# Patient Record
Sex: FEMALE | Race: WHITE | NOT HISPANIC OR LATINO | URBAN - METROPOLITAN AREA
[De-identification: names, ages, dates, MRNs, and addresses within clinical notes are randomized per-mention and may not be internally consistent; named-entity substitution may affect disease eponyms.]

---

## 2023-05-04 ENCOUNTER — OFFICE VISIT (OUTPATIENT)
Dept: PRIMARY CARE | Facility: CLINIC | Age: 58
End: 2023-05-04
Payer: MEDICARE

## 2023-05-04 VITALS
SYSTOLIC BLOOD PRESSURE: 120 MMHG | HEIGHT: 66 IN | DIASTOLIC BLOOD PRESSURE: 74 MMHG | BODY MASS INDEX: 42.75 KG/M2 | WEIGHT: 266 LBS | HEART RATE: 79 BPM

## 2023-05-04 DIAGNOSIS — R05.3 CHRONIC COUGH: ICD-10-CM

## 2023-05-04 DIAGNOSIS — Z12.31 VISIT FOR SCREENING MAMMOGRAM: ICD-10-CM

## 2023-05-04 DIAGNOSIS — Z00.00 ENCOUNTER FOR PREVENTIVE HEALTH EXAMINATION: ICD-10-CM

## 2023-05-04 DIAGNOSIS — R73.03 PREDIABETES: ICD-10-CM

## 2023-05-04 DIAGNOSIS — F33.0 MILD EPISODE OF RECURRENT MAJOR DEPRESSIVE DISORDER (CMS-HCC): Primary | ICD-10-CM

## 2023-05-04 DIAGNOSIS — Z11.59 ENCOUNTER FOR HEPATITIS C SCREENING TEST FOR LOW RISK PATIENT: ICD-10-CM

## 2023-05-04 PROBLEM — E66.813 OBESITY, CLASS III, BMI 40-49.9 (MORBID OBESITY): Status: ACTIVE | Noted: 2019-03-04

## 2023-05-04 PROBLEM — N39.41 URGE INCONTINENCE: Status: ACTIVE | Noted: 2021-11-22

## 2023-05-04 PROBLEM — N39.3 SUI (STRESS URINARY INCONTINENCE, FEMALE): Status: ACTIVE | Noted: 2021-11-22

## 2023-05-04 PROBLEM — E66.01 OBESITY, CLASS III, BMI 40-49.9 (MORBID OBESITY) (MULTI): Status: ACTIVE | Noted: 2019-03-04

## 2023-05-04 PROCEDURE — 4004F PT TOBACCO SCREEN RCVD TLK: CPT | Performed by: STUDENT IN AN ORGANIZED HEALTH CARE EDUCATION/TRAINING PROGRAM

## 2023-05-04 PROCEDURE — 99204 OFFICE O/P NEW MOD 45 MIN: CPT | Performed by: STUDENT IN AN ORGANIZED HEALTH CARE EDUCATION/TRAINING PROGRAM

## 2023-05-04 PROCEDURE — 3008F BODY MASS INDEX DOCD: CPT | Performed by: STUDENT IN AN ORGANIZED HEALTH CARE EDUCATION/TRAINING PROGRAM

## 2023-05-04 RX ORDER — LORAZEPAM 0.5 MG/1
0.5 TABLET ORAL EVERY 6 HOURS PRN
COMMUNITY
Start: 2022-08-31 | End: 2023-07-20 | Stop reason: SDUPTHER

## 2023-05-04 RX ORDER — OMEPRAZOLE 20 MG/1
20 CAPSULE, DELAYED RELEASE ORAL
COMMUNITY

## 2023-05-04 RX ORDER — NADOLOL 20 MG/1
20 TABLET ORAL DAILY
COMMUNITY
Start: 2023-02-07 | End: 2023-07-20 | Stop reason: ALTCHOICE

## 2023-05-04 RX ORDER — ESCITALOPRAM OXALATE 20 MG/1
20 TABLET ORAL DAILY
COMMUNITY
End: 2023-07-20 | Stop reason: SDUPTHER

## 2023-05-04 RX ORDER — CLOBETASOL PROPIONATE 0.5 MG/G
0.05 OINTMENT TOPICAL 2 TIMES DAILY
COMMUNITY
Start: 2022-06-03

## 2023-05-04 RX ORDER — ESCITALOPRAM OXALATE 10 MG/1
10 TABLET ORAL DAILY
COMMUNITY
End: 2023-05-04 | Stop reason: ALTCHOICE

## 2023-05-04 RX ORDER — ALBUTEROL SULFATE 90 UG/1
2 AEROSOL, METERED RESPIRATORY (INHALATION)
COMMUNITY
Start: 2023-02-10 | End: 2023-07-20 | Stop reason: SDUPTHER

## 2023-05-04 RX ORDER — ESCITALOPRAM OXALATE 5 MG/1
5 TABLET ORAL
COMMUNITY
Start: 2023-04-03 | End: 2023-05-04

## 2023-05-04 ASSESSMENT — PATIENT HEALTH QUESTIONNAIRE - PHQ9
2. FEELING DOWN, DEPRESSED OR HOPELESS: NOT AT ALL
SUM OF ALL RESPONSES TO PHQ9 QUESTIONS 1 AND 2: 0
1. LITTLE INTEREST OR PLEASURE IN DOING THINGS: NOT AT ALL

## 2023-05-04 NOTE — PROGRESS NOTES
Zenia Landrum is a 57 y.o. year old female presenting for Depression and Shortness of Breath       HPI:  Depression  She is currently taking Lexapro 15 mg daily for depression and anxiety.  Unfortunately, it is doing a better job treating her anxiety than the depression.  Is getting so bad that her family has been urging her to see a doctor again to get better treatment for her depression.  She just feels angry all the time and has no motivation to do anything.  She is willing to do what ever as far as her medications in order to improve her depression.  She denies SI and HI.  Of note, she does take as needed Ativan 0.5 mg for anxiety when she has a panic attack, but she has been out of this medication for a little while now.  She would like to use this going forward if she needs it for panic attacks.    Shortness of breath  She is a 20-pack-year history smoker, currently trying to quit.  She has noticed that for some time now, she does have a chronic cough and she does get short of breath at times with exertion.  Things really got bad when she had pneumonia 3 months ago, and she feels like she is at the point now where she needs to get her lungs tested for any asthma or COPD.  She was using albuterol as needed, but she has not needed it.  She does know that she wheezes at times.  Her lungs currently feel okay today.  ROS:   All pertinent positive symptoms are included in history of present illness.    All other systems have been reviewed and are negative and noncontributory to this patient's current ailments.    Current Outpatient Medications   Medication Sig Dispense Refill    albuterol 90 mcg/actuation inhaler Inhale 2 puffs 3 times a day.      clobetasol (Temovate) 0.05 % ointment Apply 0.05 Applications topically 2 times a day.      LORazepam (Ativan) 0.5 mg tablet Take 1 tablet (0.5 mg) by mouth every 6 hours if needed (POTS).      nadolol (Corgard) 20 mg tablet Take 1 tablet (20 mg) by mouth once daily.       "escitalopram (Lexapro) 20 mg tablet Take 1 tablet (20 mg) by mouth once daily.      omeprazole (PriLOSEC) 20 mg DR capsule Take 1 capsule (20 mg) by mouth once daily.       No current facility-administered medications for this visit.       OBJECTIVE  Visit Vitals  /74   Pulse 79   Ht 1.676 m (5' 6\")   Wt 121 kg (266 lb)   BMI 42.93 kg/m²   Smoking Status Every Day   BSA 2.37 m²        Physical Exam:  GENERAL: Alert, oriented, pleasant, in no acute distress  HEENT: Head normocephalic, atraumatic  CV: Heart with regular rate and rhythm, normal S1/S2, no murmurs; normal peripheral pulses- radial and dorsalis pedis palpable  LUNGS: CTAB without wheezing, rhonchi or rales; good respiratory effort, no increased work of breathing  ABDOMEN: soft, non-tender, non-distended, no masses appreciated; +BS in all four quadrants  EXTREMITIES: no edema, no cyanosis  PSYCH: Appropriate mood and affect  SKIN: No rashes or lesions appreciated    Assessment/Plan   Diagnoses and all orders for this visit:  Mild episode of recurrent major depressive disorder (CMS/HCC)  Try to optimize her current medication by increasing Lexapro to 20 mg daily instead of doing 15 mg daily.  I asked that she gives this 3 to 4 weeks to see if that makes a difference in her depression.  If it does not, plan on adding Wellbutrin to her regimen.  Chronic cough  With her 20-pack-year smoking history, it is certainly a possibility that she has developed COPD or asthma.  Will confirm with pulmonary function testing at this time  -     Pulmonary function test  Visit for screening mammogram  -     BI mammo bilateral screening tomosynthesis; Future  Fasting labs ordered today include:  -     CBC; Future  -     Comprehensive Metabolic Panel; Future  -     Hemoglobin A1C; Future  -     Lipid Panel; Future  -     TSH with reflex to Free T4 if abnormal; Future  -     Vitamin D, Total; Future  -     Hepatitis C Antibody; Future  -     Hemoglobin A1C; " Future    Patient to return in the next 3 to 4 weeks for depression follow-up, her yearly physical exam and we can discuss any lab results at that time as well.

## 2023-05-15 ENCOUNTER — APPOINTMENT (OUTPATIENT)
Dept: PRIMARY CARE | Facility: CLINIC | Age: 58
End: 2023-05-15

## 2023-05-16 ENCOUNTER — LAB (OUTPATIENT)
Dept: LAB | Facility: LAB | Age: 58
End: 2023-05-16
Payer: MEDICARE

## 2023-05-16 DIAGNOSIS — Z00.00 ENCOUNTER FOR PREVENTIVE HEALTH EXAMINATION: ICD-10-CM

## 2023-05-16 DIAGNOSIS — R73.03 PREDIABETES: ICD-10-CM

## 2023-05-16 DIAGNOSIS — Z11.59 ENCOUNTER FOR HEPATITIS C SCREENING TEST FOR LOW RISK PATIENT: ICD-10-CM

## 2023-05-16 LAB
ALANINE AMINOTRANSFERASE (SGPT) (U/L) IN SER/PLAS: 17 U/L (ref 7–45)
ALBUMIN (G/DL) IN SER/PLAS: 4.1 G/DL (ref 3.4–5)
ALKALINE PHOSPHATASE (U/L) IN SER/PLAS: 55 U/L (ref 33–110)
ANION GAP IN SER/PLAS: 11 MMOL/L (ref 10–20)
ASPARTATE AMINOTRANSFERASE (SGOT) (U/L) IN SER/PLAS: 14 U/L (ref 9–39)
BILIRUBIN TOTAL (MG/DL) IN SER/PLAS: 0.5 MG/DL (ref 0–1.2)
CALCIDIOL (25 OH VITAMIN D3) (NG/ML) IN SER/PLAS: 13 NG/ML
CALCIUM (MG/DL) IN SER/PLAS: 9.3 MG/DL (ref 8.6–10.3)
CARBON DIOXIDE, TOTAL (MMOL/L) IN SER/PLAS: 26 MMOL/L (ref 21–32)
CHLORIDE (MMOL/L) IN SER/PLAS: 105 MMOL/L (ref 98–107)
CHOLESTEROL (MG/DL) IN SER/PLAS: 176 MG/DL (ref 0–199)
CHOLESTEROL IN HDL (MG/DL) IN SER/PLAS: 36.2 MG/DL
CHOLESTEROL/HDL RATIO: 4.9
CREATININE (MG/DL) IN SER/PLAS: 0.56 MG/DL (ref 0.5–1.05)
ERYTHROCYTE DISTRIBUTION WIDTH (RATIO) BY AUTOMATED COUNT: 13.6 % (ref 11.5–14.5)
ERYTHROCYTE MEAN CORPUSCULAR HEMOGLOBIN CONCENTRATION (G/DL) BY AUTOMATED: 33.2 G/DL (ref 32–36)
ERYTHROCYTE MEAN CORPUSCULAR VOLUME (FL) BY AUTOMATED COUNT: 90 FL (ref 80–100)
ERYTHROCYTES (10*6/UL) IN BLOOD BY AUTOMATED COUNT: 5.03 X10E12/L (ref 4–5.2)
GFR FEMALE: >90 ML/MIN/1.73M2
GLUCOSE (MG/DL) IN SER/PLAS: 93 MG/DL (ref 74–99)
HEMATOCRIT (%) IN BLOOD BY AUTOMATED COUNT: 45.2 % (ref 36–46)
HEMOGLOBIN (G/DL) IN BLOOD: 15 G/DL (ref 12–16)
HEPATITIS C VIRUS AB PRESENCE IN SERUM: NONREACTIVE
LDL: 98 MG/DL (ref 0–99)
LEUKOCYTES (10*3/UL) IN BLOOD BY AUTOMATED COUNT: 9.9 X10E9/L (ref 4.4–11.3)
NON HDL CHOLESTEROL: 140 MG/DL
PLATELETS (10*3/UL) IN BLOOD AUTOMATED COUNT: 303 X10E9/L (ref 150–450)
POTASSIUM (MMOL/L) IN SER/PLAS: 4.3 MMOL/L (ref 3.5–5.3)
PROTEIN TOTAL: 6.9 G/DL (ref 6.4–8.2)
SODIUM (MMOL/L) IN SER/PLAS: 138 MMOL/L (ref 136–145)
THYROTROPIN (MIU/L) IN SER/PLAS BY DETECTION LIMIT <= 0.05 MIU/L: 1.58 MIU/L (ref 0.44–3.98)
TRIGLYCERIDE (MG/DL) IN SER/PLAS: 207 MG/DL (ref 0–149)
UREA NITROGEN (MG/DL) IN SER/PLAS: 9 MG/DL (ref 6–23)
VLDL: 41 MG/DL (ref 0–40)

## 2023-05-16 PROCEDURE — 85027 COMPLETE CBC AUTOMATED: CPT

## 2023-05-16 PROCEDURE — 86803 HEPATITIS C AB TEST: CPT

## 2023-05-16 PROCEDURE — 82306 VITAMIN D 25 HYDROXY: CPT

## 2023-05-16 PROCEDURE — 83036 HEMOGLOBIN GLYCOSYLATED A1C: CPT

## 2023-05-16 PROCEDURE — 80053 COMPREHEN METABOLIC PANEL: CPT

## 2023-05-16 PROCEDURE — 36415 COLL VENOUS BLD VENIPUNCTURE: CPT

## 2023-05-16 PROCEDURE — 84443 ASSAY THYROID STIM HORMONE: CPT

## 2023-05-16 PROCEDURE — 80061 LIPID PANEL: CPT

## 2023-05-17 LAB
ESTIMATED AVERAGE GLUCOSE FOR HBA1C: 123 MG/DL
HEMOGLOBIN A1C/HEMOGLOBIN TOTAL IN BLOOD: 5.9 %

## 2023-05-22 ENCOUNTER — PATIENT MESSAGE (OUTPATIENT)
Dept: PRIMARY CARE | Facility: CLINIC | Age: 58
End: 2023-05-22

## 2023-05-22 ENCOUNTER — PATIENT OUTREACH (OUTPATIENT)
Dept: PRIMARY CARE | Facility: CLINIC | Age: 58
End: 2023-05-22

## 2023-06-07 ENCOUNTER — PATIENT OUTREACH (OUTPATIENT)
Dept: PRIMARY CARE | Facility: CLINIC | Age: 58
End: 2023-06-07

## 2023-06-07 NOTE — PROGRESS NOTES
No contact on TCM outreach for 2 wks after inhospital discharge. Pt disenrolled from TCM program per TCM policy.

## 2023-07-20 ENCOUNTER — OFFICE VISIT (OUTPATIENT)
Dept: PRIMARY CARE | Facility: CLINIC | Age: 58
End: 2023-07-20
Payer: MEDICARE

## 2023-07-20 VITALS
WEIGHT: 254 LBS | HEIGHT: 66 IN | SYSTOLIC BLOOD PRESSURE: 122 MMHG | DIASTOLIC BLOOD PRESSURE: 86 MMHG | BODY MASS INDEX: 40.82 KG/M2 | HEART RATE: 79 BPM

## 2023-07-20 DIAGNOSIS — R06.02 SHORTNESS OF BREATH: ICD-10-CM

## 2023-07-20 DIAGNOSIS — I40.9: ICD-10-CM

## 2023-07-20 DIAGNOSIS — F41.9 ANXIETY: Primary | ICD-10-CM

## 2023-07-20 DIAGNOSIS — E66.01 OBESITY, CLASS III, BMI 40-49.9 (MORBID OBESITY) (MULTI): ICD-10-CM

## 2023-07-20 PROBLEM — R91.1 LUNG NODULE: Status: ACTIVE | Noted: 2023-05-19

## 2023-07-20 PROBLEM — I25.10 MILD CAD: Status: ACTIVE | Noted: 2023-05-19

## 2023-07-20 PROBLEM — I51.4 MYOCARDITIS (MULTI): Status: ACTIVE | Noted: 2023-07-20

## 2023-07-20 PROBLEM — I10 PRIMARY HYPERTENSION: Status: ACTIVE | Noted: 2023-05-18

## 2023-07-20 PROCEDURE — 3079F DIAST BP 80-89 MM HG: CPT | Performed by: STUDENT IN AN ORGANIZED HEALTH CARE EDUCATION/TRAINING PROGRAM

## 2023-07-20 PROCEDURE — 99214 OFFICE O/P EST MOD 30 MIN: CPT | Performed by: STUDENT IN AN ORGANIZED HEALTH CARE EDUCATION/TRAINING PROGRAM

## 2023-07-20 PROCEDURE — 3074F SYST BP LT 130 MM HG: CPT | Performed by: STUDENT IN AN ORGANIZED HEALTH CARE EDUCATION/TRAINING PROGRAM

## 2023-07-20 PROCEDURE — 3008F BODY MASS INDEX DOCD: CPT | Performed by: STUDENT IN AN ORGANIZED HEALTH CARE EDUCATION/TRAINING PROGRAM

## 2023-07-20 PROCEDURE — 4004F PT TOBACCO SCREEN RCVD TLK: CPT | Performed by: STUDENT IN AN ORGANIZED HEALTH CARE EDUCATION/TRAINING PROGRAM

## 2023-07-20 RX ORDER — LORAZEPAM 0.5 MG/1
0.5 TABLET ORAL EVERY 6 HOURS PRN
Qty: 30 TABLET | Refills: 0 | Status: SHIPPED | OUTPATIENT
Start: 2023-07-20 | End: 2024-02-14 | Stop reason: SDUPTHER

## 2023-07-20 RX ORDER — ATORVASTATIN CALCIUM 20 MG/1
1 TABLET, FILM COATED ORAL NIGHTLY
COMMUNITY
Start: 2023-05-20

## 2023-07-20 RX ORDER — ALBUTEROL SULFATE 90 UG/1
2 AEROSOL, METERED RESPIRATORY (INHALATION)
Qty: 18 G | Refills: 2 | Status: SHIPPED | OUTPATIENT
Start: 2023-07-20 | End: 2023-10-24

## 2023-07-20 RX ORDER — CARVEDILOL 6.25 MG/1
1 TABLET ORAL 2 TIMES DAILY
COMMUNITY
Start: 2023-07-12

## 2023-07-20 RX ORDER — ESCITALOPRAM OXALATE 20 MG/1
20 TABLET ORAL DAILY
Qty: 90 TABLET | Refills: 1 | Status: SHIPPED | OUTPATIENT
Start: 2023-07-20 | End: 2024-02-01 | Stop reason: SDUPTHER

## 2023-07-20 NOTE — PROGRESS NOTES
Zenia Landrum is a 57 y.o. year old female presenting for Med Refill       HPI:  Patient is presenting today for medication refill, requesting refill of her Lexapro 20 mg daily and Ativan 0.5 mg as needed for panic attacks.  She was unable to follow-up after increasing her Lexapro to 20 mg due to being in the hospital with suppose it myocarditis, elevated troponins, and catheterization.  She states that she is doing well on the Lexapro 20 mg, its very helpful for anxiety and her depression is okay on it.  The Ativan is very helpful as needed for panic attacks, she rarely needs to take it.    She is wondering what I can give her do for her as far as trying to lose weight.  She states she has implemented a lot of dietary changes, going low-carb, cutting out sugar and her weight has not budged at all.    She was hospitalized following her previous appointment on May 17, 2 months ago when she was complaining of chest pain and shortness of breath.  She did have elevated troponins at that time and she was diagnosed with an NSTEMI.  Subsequently, a coronary artery catheterization was performed and all of her arteries were essentially normal.  She states she did not really have any further work-up and was diagnosed with myocarditis and told to follow-up with cardiology.  She did follow-up with cardiology with [facility] on July 12, 8 days ago and she will undergo a cardiac MRI and pulmonary function testing next week.  Her medications were changed and she is now on carvedilol and atorvastatin.  ROS:   All pertinent positive symptoms are included in history of present illness.    All other systems have been reviewed and are negative and noncontributory to this patient's current ailments.    Current Outpatient Medications   Medication Sig Dispense Refill    atorvastatin (Lipitor) 20 mg tablet Take 1 tablet (20 mg) by mouth once daily at bedtime.      carvedilol (Coreg) 12.5 mg tablet Take 1 tablet (12.5 mg) by  mouth 2 times a day.      albuterol 90 mcg/actuation inhaler Inhale 2 puffs 3 times a day.      clobetasol (Temovate) 0.05 % ointment Apply 0.05 Applications topically 2 times a day.      escitalopram (Lexapro) 20 mg tablet Take 1 tablet (20 mg) by mouth once daily. 90 tablet 1    LORazepam (Ativan) 0.5 mg tablet Take 1 tablet (0.5 mg) by mouth every 6 hours if needed (POTS). 30 tablet 0    omeprazole (PriLOSEC) 20 mg DR capsule Take 1 capsule (20 mg) by mouth once daily.       No current facility-administered medications for this visit.       OBJECTIVE  Visit Vitals  Smoking Status Every Day        Physical Exam:  GENERAL: Alert, oriented, pleasant, in no acute distress  HEENT: Head normocephalic, atraumatic  CV: Heart with regular rate and rhythm, normal S1/S2, no murmurs; normal peripheral pulses- radial and dorsalis pedis palpable  LUNGS: CTAB without wheezing, rhonchi or rales; good respiratory effort, no increased work of breathing  ABDOMEN: soft, non-tender, non-distended, no masses appreciated; +BS in all four quadrants  EXTREMITIES: no edema, no cyanosis  PSYCH: Appropriate mood and affect  SKIN: No rashes or lesions appreciated    Assessment/Plan   Diagnoses and all orders for this visit:  Anxiety  Doing well on current medications.  Refills provided today  -     escitalopram (Lexapro) 20 mg tablet; Take 1 tablet (20 mg) by mouth once daily.  -     LORazepam (Ativan) 0.5 mg tablet; Take 1 tablet (0.5 mg) by mouth every 6 hours if needed (POTS).  Subacute myocarditis, unspecified myocarditis type  Doing better, following up with cardiology with cardiac MRI soon.  No cause yet for this myocarditis  Shortness of breath  Has lung function testing scheduled for next week.  Will refill albuterol in the meantime and she may need additional inhaler treatment based on her lung function testing  -     albuterol 90 mcg/actuation inhaler; Inhale 2 puffs 3 times a day.  Obesity, Class III, BMI 40-49.9 (morbid obesity)  (CMS/[facility])  We discussed a well-balanced diet and she is going to keep track of the calories she consumes daily.  She is going to inquire about coverage of weight loss medications with her insurance and get back to us.  I told her I would be happy to send weight cookie to her pharmacy if her insurance will cover it.  Consider referral to the comprehensive weight loss program

## 2023-10-24 DIAGNOSIS — R06.02 SHORTNESS OF BREATH: ICD-10-CM

## 2023-10-24 RX ORDER — ALBUTEROL SULFATE 90 UG/1
2 AEROSOL, METERED RESPIRATORY (INHALATION)
Qty: 18 G | Refills: 2 | Status: SHIPPED | OUTPATIENT
Start: 2023-10-24

## 2023-11-22 ENCOUNTER — APPOINTMENT (OUTPATIENT)
Dept: RADIOLOGY | Facility: HOSPITAL | Age: 58
End: 2023-11-22
Payer: MEDICARE

## 2023-11-22 ENCOUNTER — HOSPITAL ENCOUNTER (EMERGENCY)
Facility: HOSPITAL | Age: 58
Discharge: HOME | End: 2023-11-22
Attending: EMERGENCY MEDICINE
Payer: MEDICARE

## 2023-11-22 VITALS
RESPIRATION RATE: 20 BRPM | TEMPERATURE: 98.4 F | OXYGEN SATURATION: 94 % | DIASTOLIC BLOOD PRESSURE: 81 MMHG | WEIGHT: 220 LBS | HEIGHT: 66 IN | HEART RATE: 68 BPM | SYSTOLIC BLOOD PRESSURE: 117 MMHG | BODY MASS INDEX: 35.36 KG/M2

## 2023-11-22 DIAGNOSIS — J06.9 VIRAL UPPER RESPIRATORY TRACT INFECTION: Primary | ICD-10-CM

## 2023-11-22 DIAGNOSIS — J45.901 EXACERBATION OF ASTHMA, UNSPECIFIED ASTHMA SEVERITY, UNSPECIFIED WHETHER PERSISTENT (HHS-HCC): ICD-10-CM

## 2023-11-22 LAB
ALBUMIN SERPL BCP-MCNC: 4.5 G/DL (ref 3.4–5)
ALP SERPL-CCNC: 103 U/L (ref 33–110)
ALT SERPL W P-5'-P-CCNC: 25 U/L (ref 7–45)
ANION GAP SERPL CALC-SCNC: 14 MMOL/L (ref 10–20)
AST SERPL W P-5'-P-CCNC: 23 U/L (ref 9–39)
BASOPHILS # BLD AUTO: 0.05 X10*3/UL (ref 0–0.1)
BASOPHILS NFR BLD AUTO: 0.5 %
BILIRUB SERPL-MCNC: 0.7 MG/DL (ref 0–1.2)
BUN SERPL-MCNC: 12 MG/DL (ref 6–23)
CALCIUM SERPL-MCNC: 9.6 MG/DL (ref 8.6–10.3)
CARDIAC TROPONIN I PNL SERPL HS: 3 NG/L (ref 0–13)
CARDIAC TROPONIN I PNL SERPL HS: 3 NG/L (ref 0–13)
CHLORIDE SERPL-SCNC: 104 MMOL/L (ref 98–107)
CO2 SERPL-SCNC: 24 MMOL/L (ref 21–32)
CREAT SERPL-MCNC: 0.64 MG/DL (ref 0.5–1.05)
EOSINOPHIL # BLD AUTO: 0.7 X10*3/UL (ref 0–0.7)
EOSINOPHIL NFR BLD AUTO: 6.8 %
ERYTHROCYTE [DISTWIDTH] IN BLOOD BY AUTOMATED COUNT: 13.6 % (ref 11.5–14.5)
FLUAV RNA RESP QL NAA+PROBE: NOT DETECTED
FLUBV RNA RESP QL NAA+PROBE: NOT DETECTED
GFR SERPL CREATININE-BSD FRML MDRD: >90 ML/MIN/1.73M*2
GLUCOSE SERPL-MCNC: 94 MG/DL (ref 74–99)
HCT VFR BLD AUTO: 46.4 % (ref 36–46)
HGB BLD-MCNC: 15.5 G/DL (ref 12–16)
IMM GRANULOCYTES # BLD AUTO: 0.04 X10*3/UL (ref 0–0.7)
IMM GRANULOCYTES NFR BLD AUTO: 0.4 % (ref 0–0.9)
LACTATE SERPL-SCNC: 0.8 MMOL/L (ref 0.4–2)
LYMPHOCYTES # BLD AUTO: 2.27 X10*3/UL (ref 1.2–4.8)
LYMPHOCYTES NFR BLD AUTO: 22.2 %
MAGNESIUM SERPL-MCNC: 1.7 MG/DL (ref 1.6–2.4)
MCH RBC QN AUTO: 29 PG (ref 26–34)
MCHC RBC AUTO-ENTMCNC: 33.4 G/DL (ref 32–36)
MCV RBC AUTO: 87 FL (ref 80–100)
MONOCYTES # BLD AUTO: 1.16 X10*3/UL (ref 0.1–1)
MONOCYTES NFR BLD AUTO: 11.3 %
NEUTROPHILS # BLD AUTO: 6.01 X10*3/UL (ref 1.2–7.7)
NEUTROPHILS NFR BLD AUTO: 58.8 %
NRBC BLD-RTO: 0 /100 WBCS (ref 0–0)
PLATELET # BLD AUTO: 267 X10*3/UL (ref 150–450)
POTASSIUM SERPL-SCNC: 3.6 MMOL/L (ref 3.5–5.3)
PROT SERPL-MCNC: 7.8 G/DL (ref 6.4–8.2)
RBC # BLD AUTO: 5.34 X10*6/UL (ref 4–5.2)
SARS-COV-2 RNA RESP QL NAA+PROBE: NOT DETECTED
SODIUM SERPL-SCNC: 138 MMOL/L (ref 136–145)
WBC # BLD AUTO: 10.2 X10*3/UL (ref 4.4–11.3)

## 2023-11-22 PROCEDURE — 2500000002 HC RX 250 W HCPCS SELF ADMINISTERED DRUGS (ALT 637 FOR MEDICARE OP, ALT 636 FOR OP/ED): Performed by: EMERGENCY MEDICINE

## 2023-11-22 PROCEDURE — 84484 ASSAY OF TROPONIN QUANT: CPT | Performed by: EMERGENCY MEDICINE

## 2023-11-22 PROCEDURE — 71045 X-RAY EXAM CHEST 1 VIEW: CPT | Mod: FOREIGN READ | Performed by: RADIOLOGY

## 2023-11-22 PROCEDURE — 83735 ASSAY OF MAGNESIUM: CPT | Performed by: EMERGENCY MEDICINE

## 2023-11-22 PROCEDURE — 36415 COLL VENOUS BLD VENIPUNCTURE: CPT | Performed by: EMERGENCY MEDICINE

## 2023-11-22 PROCEDURE — 99285 EMERGENCY DEPT VISIT HI MDM: CPT | Mod: 25 | Performed by: EMERGENCY MEDICINE

## 2023-11-22 PROCEDURE — 85025 COMPLETE CBC W/AUTO DIFF WBC: CPT | Performed by: EMERGENCY MEDICINE

## 2023-11-22 PROCEDURE — 80053 COMPREHEN METABOLIC PANEL: CPT | Performed by: EMERGENCY MEDICINE

## 2023-11-22 PROCEDURE — 71045 X-RAY EXAM CHEST 1 VIEW: CPT | Mod: FR

## 2023-11-22 PROCEDURE — 94640 AIRWAY INHALATION TREATMENT: CPT

## 2023-11-22 PROCEDURE — 83605 ASSAY OF LACTIC ACID: CPT | Performed by: EMERGENCY MEDICINE

## 2023-11-22 PROCEDURE — 2500000004 HC RX 250 GENERAL PHARMACY W/ HCPCS (ALT 636 FOR OP/ED): Performed by: EMERGENCY MEDICINE

## 2023-11-22 PROCEDURE — 87636 SARSCOV2 & INF A&B AMP PRB: CPT | Performed by: EMERGENCY MEDICINE

## 2023-11-22 RX ORDER — PREDNISONE 20 MG/1
40 TABLET ORAL ONCE
Status: COMPLETED | OUTPATIENT
Start: 2023-11-22 | End: 2023-11-22

## 2023-11-22 RX ORDER — PREDNISONE 20 MG/1
40 TABLET ORAL DAILY
Qty: 8 TABLET | Refills: 0 | Status: SHIPPED | OUTPATIENT
Start: 2023-11-22 | End: 2023-11-22 | Stop reason: SDUPTHER

## 2023-11-22 RX ORDER — PREDNISONE 20 MG/1
40 TABLET ORAL DAILY
Qty: 8 TABLET | Refills: 0 | Status: SHIPPED | OUTPATIENT
Start: 2023-11-22 | End: 2023-11-26

## 2023-11-22 RX ORDER — ACETAMINOPHEN 325 MG/1
975 TABLET ORAL ONCE
Status: COMPLETED | OUTPATIENT
Start: 2023-11-22 | End: 2023-11-22

## 2023-11-22 RX ORDER — IPRATROPIUM BROMIDE AND ALBUTEROL SULFATE 2.5; .5 MG/3ML; MG/3ML
3 SOLUTION RESPIRATORY (INHALATION) EVERY 20 MIN
Status: COMPLETED | OUTPATIENT
Start: 2023-11-22 | End: 2023-11-22

## 2023-11-22 RX ADMIN — IPRATROPIUM BROMIDE AND ALBUTEROL SULFATE 3 ML: 2.5; .5 SOLUTION RESPIRATORY (INHALATION) at 19:11

## 2023-11-22 RX ADMIN — IPRATROPIUM BROMIDE AND ALBUTEROL SULFATE 3 ML: 2.5; .5 SOLUTION RESPIRATORY (INHALATION) at 19:28

## 2023-11-22 RX ADMIN — PREDNISONE 40 MG: 20 TABLET ORAL at 22:10

## 2023-11-22 RX ADMIN — ACETAMINOPHEN 975 MG: 325 TABLET ORAL at 18:51

## 2023-11-22 RX ADMIN — IPRATROPIUM BROMIDE AND ALBUTEROL SULFATE 3 ML: 2.5; .5 SOLUTION RESPIRATORY (INHALATION) at 19:17

## 2023-11-22 ASSESSMENT — PAIN SCALES - GENERAL
PAINLEVEL_OUTOF10: 0 - NO PAIN
PAINLEVEL_OUTOF10: 0 - NO PAIN

## 2023-11-22 ASSESSMENT — PAIN DESCRIPTION - DESCRIPTORS: DESCRIPTORS: ACHING

## 2023-11-22 ASSESSMENT — COLUMBIA-SUICIDE SEVERITY RATING SCALE - C-SSRS
2. HAVE YOU ACTUALLY HAD ANY THOUGHTS OF KILLING YOURSELF?: NO
1. IN THE PAST MONTH, HAVE YOU WISHED YOU WERE DEAD OR WISHED YOU COULD GO TO SLEEP AND NOT WAKE UP?: NO
6. HAVE YOU EVER DONE ANYTHING, STARTED TO DO ANYTHING, OR PREPARED TO DO ANYTHING TO END YOUR LIFE?: NO

## 2023-11-22 ASSESSMENT — PAIN - FUNCTIONAL ASSESSMENT
PAIN_FUNCTIONAL_ASSESSMENT: 0-10
PAIN_FUNCTIONAL_ASSESSMENT: 0-10

## 2023-11-22 NOTE — ED PROVIDER NOTES
HPI   Chief Complaint   Patient presents with    Chest Pain    Shortness of Breath    Cough       HPI    58-year-old female PMH prior pneumonia  complicated by pericarditis/myocarditis, HTN, anxiety, presenting with flulike symptoms, shortness of breath and wheezing.  Patient reports she started feeling ill 3 days ago.  Has been having chills, but no fevers.  Having a cough which is nonproductive.  Reports some chest tightness, more when she feels short of breath.  No other chest pain.  No abdominal pain or nausea/vomiting.  Reports she has had wheezing difficulty breathing, uses home inhaler, but she only has issues when she has illness.  No known diagnosis of asthma or COPD.  Has 1 friend member that was diagnosed with pneumonia recently, no other known sick contacts.  No recent travel.  No diarrhea.  No dysuria hematuria.  No leg swelling or calf pain.  No history of blood clots, non-smoker.                    [place] Coma Scale Score: 15                  Patient History   Past Medical History:   Diagnosis Date    Chronic sinusitis, unspecified     Sinusitis    Chronic sinusitis, unspecified     Sinusitis    Hypertension     MI (myocardial infarction) (CMS/[facility])     Other conditions influencing health status     Jaw Pain    Personal history of other specified conditions     History of headache    Personal history of other specified conditions     History of abdominal pain     Past Surgical History:   Procedure Laterality Date    FOOT SURGERY  06/28/2013    Foot Surgery    HYSTERECTOMY  06/28/2013    Hysterectomy     No family history on file.  Social History     Tobacco Use    Smoking status: Every Day     Packs/day: 0.50     Years: 40.00     Additional pack years: 0.00     Total pack years: 20.00     Types: Cigarettes    Smokeless tobacco: Never   Substance Use Topics    Alcohol use: Not on file    Drug use: Not on file       Physical Exam   ED Triage Vitals [11/22/23 1743]   Temp Heart Rate Resp BP   38.4 °C  (101.2 °F) 83 22 156/84      SpO2 Temp Source Heart Rate Source Patient Position   94 % Temporal Monitor Sitting      BP Location FiO2 (%)     Left arm --       Physical Exam  Vitals and nursing note reviewed.   Constitutional:       General: She is not in acute distress.     Appearance: Normal appearance. She is not ill-appearing, toxic-appearing or diaphoretic.   HENT:      Head: Normocephalic and atraumatic.      Nose: Nose normal. No congestion or rhinorrhea.      Mouth/Throat:      Mouth: Mucous membranes are moist.      Pharynx: Oropharynx is clear. No oropharyngeal exudate or posterior oropharyngeal erythema.   Eyes:      General: No scleral icterus.     Extraocular Movements: Extraocular movements intact.      Pupils: Pupils are equal, round, and reactive to light.   Cardiovascular:      Rate and Rhythm: Normal rate and regular rhythm.      Pulses: Normal pulses.      Heart sounds: Normal heart sounds. No murmur heard.     No friction rub. No gallop.   Pulmonary:      Effort: Pulmonary effort is normal. No respiratory distress.      Breath sounds: No stridor. Wheezing present. No rhonchi or rales.   Chest:      Chest wall: No tenderness.   Abdominal:      General: Abdomen is flat. There is no distension.      Palpations: Abdomen is soft. There is no mass.      Tenderness: There is no abdominal tenderness. There is no guarding or rebound.      Hernia: No hernia is present.   Musculoskeletal:         General: No swelling, tenderness, deformity or signs of injury. Normal range of motion.      Right lower leg: No edema.      Left lower leg: No edema.   Skin:     General: Skin is warm and dry.      Capillary Refill: Capillary refill takes less than 2 seconds.      Coloration: Skin is not jaundiced.      Findings: No rash.   Neurological:      General: No focal deficit present.      Mental Status: She is alert and oriented to person, place, and time. Mental status is at baseline.      Cranial Nerves: No cranial  nerve deficit.      Sensory: No sensory deficit.      Motor: No weakness.      Coordination: Coordination normal.      Gait: Gait normal.   Psychiatric:         Mood and Affect: Mood normal.         Behavior: Behavior normal.         Thought Content: Thought content normal.         Judgment: Judgment normal.         ED Course & MDM   Diagnoses as of 11/24/23 1331   Viral upper respiratory tract infection   Exacerbation of asthma, unspecified asthma severity, unspecified whether persistent       Medical Decision Making    Clinical Considerations:  - EKG interpreted independently by me revealing: NSR, HR 80, normal axis, normal intervals, no ST elevations  - External Records Reviewed: I reviewed recent and relevant outside records including: [PCP notes, prior discharge summary,   - Independent Interpretation of Studies: I independently interpreted: CXR: no consolidation or edema  - Prescription Drug Consideration: prednisone, albuterol (already has)  - Diagnostic testing considered: HEART score 2    Overall Medical Decision Making  - 58-year-old female presents with shortness of breath and wheezing.  Has had some viral URI symptoms, seems to have similar episodes of wheezing whenever she gets a URI.  Does have history of pneumonia.  Overall well-appearing. Febrile with temp 101.2F. No hypoxia.  Does have some scattered wheezing on exam.  No leg swelling or calf tenderness.  COVID and flu swabs negative.  Labs unremarkable including negative troponin x 2.  Very low suspicion and low risk for acute cardiac pathology. Prior hx of pericarditis/myocarditis after viral infection, no signs now. No CP, no EKG changes, normal troponin. Normal lactate.  X-ray did not show any evidence of pneumonia.  Patient did feel much better after DuoNeb, likely URI causing exacerbation of reactive airway disease.  Fever improved after meds to normal range time.  Patient comfortable at this time going home, she feels better with p.o. intake  tolerated.  Will do short course of steroids, she has albuterol at home already.  Instructed follow-up with PCP and possibly pulmonology in the future.  Instructed on further supportive care and given return cautions.      Procedure  Procedures     Juvencio Cox MD  11/24/23 3037

## 2023-11-23 NOTE — DISCHARGE INSTRUCTIONS
Take albuterol as needed for wheezing.  Take Tylenol or ibuprofen as needed for any fevers.  Take steroids for full course.  If new or worsening symptoms, return to the ED

## 2023-12-01 ENCOUNTER — OFFICE VISIT (OUTPATIENT)
Dept: PRIMARY CARE | Facility: CLINIC | Age: 58
End: 2023-12-01
Payer: MEDICARE

## 2023-12-01 VITALS
SYSTOLIC BLOOD PRESSURE: 140 MMHG | HEIGHT: 66 IN | HEART RATE: 92 BPM | BODY MASS INDEX: 37.61 KG/M2 | DIASTOLIC BLOOD PRESSURE: 80 MMHG | WEIGHT: 234 LBS

## 2023-12-01 DIAGNOSIS — J06.9 VIRAL URI WITH COUGH: Primary | ICD-10-CM

## 2023-12-01 PROBLEM — I51.4 MYOCARDITIS (MULTI): Status: RESOLVED | Noted: 2023-07-20 | Resolved: 2023-12-01

## 2023-12-01 PROCEDURE — 3079F DIAST BP 80-89 MM HG: CPT | Performed by: STUDENT IN AN ORGANIZED HEALTH CARE EDUCATION/TRAINING PROGRAM

## 2023-12-01 PROCEDURE — 3077F SYST BP >= 140 MM HG: CPT | Performed by: STUDENT IN AN ORGANIZED HEALTH CARE EDUCATION/TRAINING PROGRAM

## 2023-12-01 PROCEDURE — 3008F BODY MASS INDEX DOCD: CPT | Performed by: STUDENT IN AN ORGANIZED HEALTH CARE EDUCATION/TRAINING PROGRAM

## 2023-12-01 PROCEDURE — 99213 OFFICE O/P EST LOW 20 MIN: CPT | Performed by: STUDENT IN AN ORGANIZED HEALTH CARE EDUCATION/TRAINING PROGRAM

## 2023-12-01 PROCEDURE — 4004F PT TOBACCO SCREEN RCVD TLK: CPT | Performed by: STUDENT IN AN ORGANIZED HEALTH CARE EDUCATION/TRAINING PROGRAM

## 2023-12-01 ASSESSMENT — ENCOUNTER SYMPTOMS
FREQUENCY: 0
DIFFICULTY URINATING: 0
MUSCULOSKELETAL NEGATIVE: 1
UNEXPECTED WEIGHT CHANGE: 0
FATIGUE: 1
HEMATURIA: 0
SHORTNESS OF BREATH: 1
NEUROLOGICAL NEGATIVE: 1
WHEEZING: 0
FEVER: 0
CHEST TIGHTNESS: 1
PSYCHIATRIC NEGATIVE: 1
CHILLS: 0
DYSURIA: 0
FLANK PAIN: 0
COUGH: 1
STRIDOR: 0
GASTROINTESTINAL NEGATIVE: 1
CARDIOVASCULAR NEGATIVE: 1
EYES NEGATIVE: 1
HEMATOLOGIC/LYMPHATIC NEGATIVE: 1

## 2023-12-01 ASSESSMENT — PATIENT HEALTH QUESTIONNAIRE - PHQ9
SUM OF ALL RESPONSES TO PHQ9 QUESTIONS 1 AND 2: 0
2. FEELING DOWN, DEPRESSED OR HOPELESS: NOT AT ALL
1. LITTLE INTEREST OR PLEASURE IN DOING THINGS: NOT AT ALL

## 2023-12-01 NOTE — PROGRESS NOTES
"Subjective   Patient ID: Zenia Landrum is a 58 y.o. female who presents for Hospital Follow-up.    HPI     Recent upper respiratory infection  Onset: 11/19/23 (13 days ago)  Associated symptoms: shortness of breath, wheezing, cough  Course: ED visit on 11/22/23 for symptoms, was febrile, however, had normal EKG, CxR, blood labs, negative COVID and Flu swabs. Had improvement of fever and shortness of breath after DuoNeb treatment. Discharged with short course of steroids and instructed to use home albuterol as needed and follow- up here.   Concerns in clinic today: Continued Shortness of breath and chest tightness (present since onset, does not feel that it is cardiac related, feels like \"chest congestion\"). Admits to not using albuterol as often as recommended.  2.   Pulmonology referral request   -Recommended by her cardiologist given recent MI   -Hx of tobacco use   -Continued shortness of breath  3.   Urology referral request   -Was following with urology at the [facility] for urinary incontinence, however, due to insurance coverage change, she is no longer covered to see them. Failed a trial of oxybutynin is interested in further treatment evaluation.     Review of Systems   Constitutional:  Positive for fatigue. Negative for chills, fever and unexpected weight change.   HENT: Negative.     Eyes: Negative.    Respiratory:  Positive for cough, chest tightness and shortness of breath. Negative for wheezing and stridor.    Cardiovascular: Negative.    Gastrointestinal: Negative.    Genitourinary:  Positive for enuresis. Negative for decreased urine volume, difficulty urinating, dysuria, flank pain, frequency and hematuria.   Musculoskeletal: Negative.    Skin: Negative.    Neurological: Negative.    Hematological: Negative.    Psychiatric/Behavioral: Negative.         Objective   /80   Pulse 92   Ht 1.676 m (5' 6\")   Wt 106 kg (234 lb)   BMI 37.77 kg/m²     Physical Exam  Constitutional:       " Appearance: Normal appearance.   HENT:      Head: Normocephalic and atraumatic.   Cardiovascular:      Rate and Rhythm: Normal rate and regular rhythm.   Pulmonary:      Effort: Pulmonary effort is normal.      Comments: Scattered rhonchi on the right that clear after coughing/deep inspiration-expiration  Musculoskeletal:      Cervical back: Normal range of motion and neck supple.   Skin:     General: Skin is warm and dry.   Neurological:      Mental Status: She is alert.   Psychiatric:         Mood and Affect: Mood normal.         Behavior: Behavior normal.         Thought Content: Thought content normal.         Judgment: Judgment normal.         Assessment/Plan   Diagnoses and all orders for this visit:  Viral URI with cough  Continue supportive care.  I recommend Mucinex and albuterol.  Call the office with any worsening symptoms

## 2024-01-03 ENCOUNTER — OFFICE VISIT (OUTPATIENT)
Dept: CARDIOLOGY | Facility: HOSPITAL | Age: 59
End: 2024-01-03
Payer: MEDICARE

## 2024-01-03 VITALS
BODY MASS INDEX: 37.94 KG/M2 | WEIGHT: 236.1 LBS | DIASTOLIC BLOOD PRESSURE: 80 MMHG | HEART RATE: 76 BPM | HEIGHT: 66 IN | SYSTOLIC BLOOD PRESSURE: 120 MMHG

## 2024-01-03 DIAGNOSIS — E78.2 MIXED HYPERLIPIDEMIA: Primary | ICD-10-CM

## 2024-01-03 PROCEDURE — 93005 ELECTROCARDIOGRAM TRACING: CPT | Performed by: INTERNAL MEDICINE

## 2024-01-03 PROCEDURE — 99214 OFFICE O/P EST MOD 30 MIN: CPT | Performed by: INTERNAL MEDICINE

## 2024-01-03 PROCEDURE — 3008F BODY MASS INDEX DOCD: CPT | Performed by: INTERNAL MEDICINE

## 2024-01-03 PROCEDURE — 1036F TOBACCO NON-USER: CPT | Performed by: INTERNAL MEDICINE

## 2024-01-03 PROCEDURE — 3074F SYST BP LT 130 MM HG: CPT | Performed by: INTERNAL MEDICINE

## 2024-01-03 PROCEDURE — 3079F DIAST BP 80-89 MM HG: CPT | Performed by: INTERNAL MEDICINE

## 2024-01-03 RX ORDER — NAPROXEN SODIUM 220 MG/1
81 TABLET, FILM COATED ORAL
COMMUNITY
Start: 2023-05-20

## 2024-01-03 ASSESSMENT — ENCOUNTER SYMPTOMS
OCCASIONAL FEELINGS OF UNSTEADINESS: 0
LOSS OF SENSATION IN FEET: 0
DEPRESSION: 0

## 2024-01-03 NOTE — PROGRESS NOTES
Subjective:  Patient returns for a routine follow-up.  She did have a brief ER visit for some dyspnea from a probable respiratory infection.  She feels she is about back to baseline.  She is still struggling with some intermittent dyspnea and wheezing.  She does notice some tightness with this at times.  She is not having any clear pericarditic type pain.  She has some occasional palpitations but no sustained palpitations or syncope.  She remains compliant with her beta-blocker and atorvastatin therapy.  She is using her inhaler as needed.  She is awaiting to see a new pulmonologist in the  system now that she has appropriate insurance coverage.  She offers no other new health concerns.    Objective:  Alert, General: Alert, usual pleasant self.  HEENT: Unchanged.  Lungs: Mild expiratory prolongation but reasonable air exchange with no crackles or wheezing.  Cardiac: Distant heart tones without change.  Abdomen: Nontender with normal bowel sounds.  Extremities: No edema.  Skin: No acute rash.  Neuro: Grossly intact.    Impression/plan:  Patient is doing reasonably well at this time.  Her palpitations are fairly limited on her current beta-blocker regimen.  Her blood pressure is also under good control.  She is not having any worrisome or frequent chest pain symptomatology to suggest recurrent pericarditis.  I do suspect most of her symptomatology is probably related to her asthma.  I elected to embark on no repeat cardiovascular testing at this time and will not make any medication changes.  I will await the recommendations from her pulmonologist to hopefully get her asthma optimally treated.    I will see her back in 3 months and we will recheck a lipid panel just before she returns.  She knows to call for any intercurrent cardiac type symptomatology.  She did not need any prescriptions renewed.    Patient instructions:    Continue current medications unchanged.    Keep me updated regarding your pulmonary  evaluation.    Return to clinic in 3 months.    Obtain fasting lipid panel just before your next visit.    Call for any intercurrent concerns.

## 2024-01-12 DIAGNOSIS — F41.9 ANXIETY: ICD-10-CM

## 2024-01-12 RX ORDER — ESCITALOPRAM OXALATE 20 MG/1
20 TABLET ORAL DAILY
Qty: 90 TABLET | Refills: 1 | OUTPATIENT
Start: 2024-01-12

## 2024-02-01 DIAGNOSIS — F41.9 ANXIETY: ICD-10-CM

## 2024-02-01 DIAGNOSIS — F41.9 ANXIETY: Primary | ICD-10-CM

## 2024-02-01 RX ORDER — ESCITALOPRAM OXALATE 20 MG/1
20 TABLET ORAL DAILY
Qty: 90 TABLET | Refills: 1 | OUTPATIENT
Start: 2024-02-01

## 2024-02-01 RX ORDER — ESCITALOPRAM OXALATE 20 MG/1
20 TABLET ORAL DAILY
Qty: 90 TABLET | Refills: 1 | Status: SHIPPED | OUTPATIENT
Start: 2024-02-01

## 2024-02-01 RX ORDER — LORAZEPAM 0.5 MG/1
0.5 TABLET ORAL EVERY 6 HOURS PRN
Qty: 30 TABLET | Refills: 0 | OUTPATIENT
Start: 2024-02-01

## 2024-02-14 ENCOUNTER — OFFICE VISIT (OUTPATIENT)
Dept: PRIMARY CARE | Facility: CLINIC | Age: 59
End: 2024-02-14
Payer: COMMERCIAL

## 2024-02-14 VITALS
WEIGHT: 234 LBS | DIASTOLIC BLOOD PRESSURE: 72 MMHG | HEART RATE: 66 BPM | SYSTOLIC BLOOD PRESSURE: 118 MMHG | BODY MASS INDEX: 37.61 KG/M2 | HEIGHT: 66 IN

## 2024-02-14 DIAGNOSIS — K21.9 GASTROESOPHAGEAL REFLUX DISEASE WITHOUT ESOPHAGITIS: ICD-10-CM

## 2024-02-14 DIAGNOSIS — Z12.11 ENCOUNTER FOR SCREENING FOR MALIGNANT NEOPLASM OF COLON: ICD-10-CM

## 2024-02-14 DIAGNOSIS — G90.A POTS (POSTURAL ORTHOSTATIC TACHYCARDIA SYNDROME): ICD-10-CM

## 2024-02-14 DIAGNOSIS — F41.9 ANXIETY: Primary | ICD-10-CM

## 2024-02-14 PROCEDURE — 3074F SYST BP LT 130 MM HG: CPT | Performed by: STUDENT IN AN ORGANIZED HEALTH CARE EDUCATION/TRAINING PROGRAM

## 2024-02-14 PROCEDURE — 3008F BODY MASS INDEX DOCD: CPT | Performed by: STUDENT IN AN ORGANIZED HEALTH CARE EDUCATION/TRAINING PROGRAM

## 2024-02-14 PROCEDURE — 99214 OFFICE O/P EST MOD 30 MIN: CPT | Performed by: STUDENT IN AN ORGANIZED HEALTH CARE EDUCATION/TRAINING PROGRAM

## 2024-02-14 PROCEDURE — 1036F TOBACCO NON-USER: CPT | Performed by: STUDENT IN AN ORGANIZED HEALTH CARE EDUCATION/TRAINING PROGRAM

## 2024-02-14 PROCEDURE — 3078F DIAST BP <80 MM HG: CPT | Performed by: STUDENT IN AN ORGANIZED HEALTH CARE EDUCATION/TRAINING PROGRAM

## 2024-02-14 RX ORDER — LORAZEPAM 0.5 MG/1
0.5 TABLET ORAL EVERY 6 HOURS PRN
Qty: 30 TABLET | Refills: 0 | Status: SHIPPED | OUTPATIENT
Start: 2024-02-14

## 2024-02-14 ASSESSMENT — PATIENT HEALTH QUESTIONNAIRE - PHQ9
1. LITTLE INTEREST OR PLEASURE IN DOING THINGS: NOT AT ALL
SUM OF ALL RESPONSES TO PHQ9 QUESTIONS 1 AND 2: 0
2. FEELING DOWN, DEPRESSED OR HOPELESS: NOT AT ALL

## 2024-02-14 NOTE — PROGRESS NOTES
Zenia Landrum is a 58 y.o. year old female presenting for Postural Orthostatic Tachycardia Syndrome (Pots) and Anxiety       HPI:  POTS  She is due for refill of her Ativan 0.5 mg as needed when she is having bad hot symptoms.  It has been one of the only things that has been helpful for her POTS symptoms and she really takes it sparingly when her symptoms are severe.  Her last refill for 30 tablets lasted her several months.  Reports no side effects to the medication up to this point.    Anxiety  She is taking Lexapro 20 mg daily for anxiety control, but she is not sure how much is actually helping anymore.  She would like to try to come down on the dose a little bit and see if it actually is helpful, but she knows that she can just stop the medication and then she will have to titrate off per this provider's recommendation.    GERD  She states she has been having more nausea over the last month.  She states it comes in waves and then it just goes away on its own.  She was not sure if it is the carvedilol causing this or some other issue with her GERD.  She is technically due for EGD and colonoscopy this year because it has been 5 years since her previous scopes.  She is not so nauseous that she is vomiting, and she does not really want to mess with her medications at this time.  No unintentional weight loss or trouble swallowing.    ROS:   All pertinent positive symptoms are included in history of present illness.    All other systems have been reviewed and are negative and noncontributory to this patient's current ailments.    Current Outpatient Medications   Medication Sig Dispense Refill    albuterol 90 mcg/actuation inhaler INHALE 2 PUFFS 3 TIMES A DAY 18 g 2    aspirin 81 mg chewable tablet Chew 1 tablet (81 mg) once daily.      atorvastatin (Lipitor) 20 mg tablet Take 1 tablet (20 mg) by mouth once daily at bedtime.      carvedilol (Coreg) 6.25 mg tablet Take 1 tablet (6.25 mg) by mouth 2 times a day.       "clobetasol (Temovate) 0.05 % ointment Apply 0.05 Applications topically 2 times a day.      escitalopram (Lexapro) 20 mg tablet Take 1 tablet (20 mg) by mouth once daily. 90 tablet 1    omeprazole (PriLOSEC) 20 mg DR capsule Take 1 capsule (20 mg) by mouth once daily.      LORazepam (Ativan) 0.5 mg tablet Take 1 tablet (0.5 mg) by mouth every 6 hours if needed (POTS). 30 tablet 0     No current facility-administered medications for this visit.       OBJECTIVE  Visit Vitals  /72   Pulse 66   Ht 1.676 m (5' 6\")   Wt 106 kg (234 lb)   BMI 37.77 kg/m²   OB Status Hysterectomy   Smoking Status Former   BSA 2.22 m²        Physical Exam:  GENERAL: Alert, oriented, pleasant, in no acute distress  HEENT: Head normocephalic, atraumatic  EXTREMITIES: no edema, no cyanosis  PSYCH: Appropriate mood and affect  SKIN: No rashes or lesions appreciated    Assessment/Plan   Diagnoses and all orders for this visit:  Anxiety  I recommend cutting her Lexapro in half, down to 10 mg daily for the next 3 weeks and see how she is doing.  If her anxiety increases then we know that the Lexapro is actually helping.  If it stays the same, we will decrease the dose again by half for another 3 weeks.  She will call the office if she has any trouble with this process  Gastroesophageal reflux disease without esophagitis  Continue omeprazole 20 mg daily.  Monitor the nausea for now and she will be due for EGD this year.  Ordered for her at this time  -     EGD; Future  POTS (postural orthostatic tachycardia syndrome)  Doing well on as needed and sparingly use lorazepam 0.5 mg daily  Controlled substance agreement completed today and [place] is appropriate  -     LORazepam (Ativan) 0.5 mg tablet; Take 1 tablet (0.5 mg) by mouth every 6 hours if needed (POTS).  Encounter for screening for malignant neoplasm of colon  -     Colonoscopy Screening; Average Risk Patient; Future    Please follow-up in 3 to 6 months for yearly physical.       "

## 2024-04-03 ENCOUNTER — OFFICE VISIT (OUTPATIENT)
Dept: CARDIOLOGY | Facility: HOSPITAL | Age: 59
End: 2024-04-03
Payer: COMMERCIAL

## 2024-04-03 VITALS
HEIGHT: 66 IN | BODY MASS INDEX: 37.97 KG/M2 | WEIGHT: 236.3 LBS | SYSTOLIC BLOOD PRESSURE: 125 MMHG | HEART RATE: 55 BPM | DIASTOLIC BLOOD PRESSURE: 80 MMHG

## 2024-04-03 DIAGNOSIS — R00.2 PALPITATIONS: Primary | ICD-10-CM

## 2024-04-03 DIAGNOSIS — R06.02 SHORTNESS OF BREATH: ICD-10-CM

## 2024-04-03 LAB
ATRIAL RATE: 55 BPM
P AXIS: 34 DEGREES
P OFFSET: 185 MS
P ONSET: 131 MS
PR INTERVAL: 178 MS
Q ONSET: 220 MS
QRS COUNT: 9 BEATS
QRS DURATION: 80 MS
QT INTERVAL: 410 MS
QTC CALCULATION(BAZETT): 392 MS
QTC FREDERICIA: 398 MS
R AXIS: 76 DEGREES
T AXIS: 78 DEGREES
T OFFSET: 425 MS
VENTRICULAR RATE: 55 BPM

## 2024-04-03 PROCEDURE — 93005 ELECTROCARDIOGRAM TRACING: CPT | Performed by: INTERNAL MEDICINE

## 2024-04-03 PROCEDURE — 3079F DIAST BP 80-89 MM HG: CPT | Performed by: INTERNAL MEDICINE

## 2024-04-03 PROCEDURE — 99214 OFFICE O/P EST MOD 30 MIN: CPT | Performed by: INTERNAL MEDICINE

## 2024-04-03 PROCEDURE — 93010 ELECTROCARDIOGRAM REPORT: CPT | Performed by: INTERNAL MEDICINE

## 2024-04-03 PROCEDURE — 3074F SYST BP LT 130 MM HG: CPT | Performed by: INTERNAL MEDICINE

## 2024-04-03 PROCEDURE — 3008F BODY MASS INDEX DOCD: CPT | Performed by: INTERNAL MEDICINE

## 2024-04-03 PROCEDURE — 1036F TOBACCO NON-USER: CPT | Performed by: INTERNAL MEDICINE

## 2024-04-03 NOTE — PROGRESS NOTES
Subjective:  Patient returns for a routine 3-month follow-up.  She is a 58-year-old female that came to our attention last year.  She had a bout of pericarditis in May.  She did undergo a cardiac catheterization which revealed normal coronary arteries.  She comes back today for routine follow-up.  She is still having some occasional shortness of breath and chest discomfort but nothing overly problematic.  It is not clearly related to exertion.  She unfortunately did not follow-up with pulmonary due to the cost.  She feels her asthma is under reasonable control at this time.  She has not had any hospitalizations and denies any other new health concerns.  She remains compliant with her medications and is tolerating them well.  She also did not get her lipid panel checked as requested.  She is having some limited palpitations but no sustained palpitations or syncope.  It does appear that she probably will need carpal tunnel surgery in the not-too-distant future.    Objective:  General: Alert, pleasant overweight middle-aged female.  HEENT: Unchanged.  Lungs: Clear without crackles or wheezing.  Cardiac: Distant heart tones without murmur rub or S3.  Abdomen: Nontender.  Extremities: No edema.  Skin: No rash.  Neuro: Grossly intact.    EKG: Sinus bradycardia.  Borderline low voltage.  No acute changes.    Impression/plan:  Patient generally is clinically stable at this time.  I was pleased to see that her asthma appears to be under reasonable control.  I did encourage her to try to get into see pulmonary when her insurance will allow.  Her blood pressure remains under very good control on monotherapy with carvedilol.  I was pleased to see she is not having any problematic symptoms to suggest recurrent pericarditis.    I will plan on having her get a repeat lipid panel checked in the near future and she will call to review the results.  I will make further recommendations at that time.    I will see her back for routine  follow-up in 4 months, and we will plan on rechecking an echo primarily to reassess her LV just to be sure we are not missing any occult amyloidosis given her problems with carpal tunnel.    Patient instructions:    Continue current medications unchanged.    Obtain fasting lipid panel and call for results.    Return to clinic in 4 months for follow-up office visit and repeat echocardiogram.

## 2024-07-09 DIAGNOSIS — F41.9 ANXIETY: ICD-10-CM

## 2024-07-09 RX ORDER — ESCITALOPRAM OXALATE 20 MG/1
20 TABLET ORAL DAILY
Qty: 90 TABLET | Refills: 1 | Status: SHIPPED | OUTPATIENT
Start: 2024-07-09

## 2024-07-25 ENCOUNTER — APPOINTMENT (OUTPATIENT)
Dept: PULMONOLOGY | Facility: CLINIC | Age: 59
End: 2024-07-25
Payer: COMMERCIAL

## 2024-07-25 DIAGNOSIS — J84.9 ILD (INTERSTITIAL LUNG DISEASE) (MULTI): Primary | ICD-10-CM

## 2024-08-05 ENCOUNTER — APPOINTMENT (OUTPATIENT)
Dept: PULMONOLOGY | Facility: CLINIC | Age: 59
End: 2024-08-05
Payer: COMMERCIAL

## 2024-08-07 ENCOUNTER — LAB (OUTPATIENT)
Dept: LAB | Facility: LAB | Age: 59
End: 2024-08-07
Payer: COMMERCIAL

## 2024-08-07 DIAGNOSIS — Z79.899 MEDICATION MANAGEMENT: ICD-10-CM

## 2024-08-07 DIAGNOSIS — G90.A POTS (POSTURAL ORTHOSTATIC TACHYCARDIA SYNDROME): ICD-10-CM

## 2024-08-07 DIAGNOSIS — Z79.899 MEDICATION MANAGEMENT: Primary | ICD-10-CM

## 2024-08-07 DIAGNOSIS — E78.2 MIXED HYPERLIPIDEMIA: ICD-10-CM

## 2024-08-07 LAB
ALT SERPL W P-5'-P-CCNC: 18 U/L (ref 7–45)
AMPHETAMINES UR QL SCN: ABNORMAL
AST SERPL W P-5'-P-CCNC: 16 U/L (ref 9–39)
BARBITURATES UR QL SCN: ABNORMAL
BENZODIAZ UR QL SCN: ABNORMAL
BZE UR QL SCN: ABNORMAL
CANNABINOIDS UR QL SCN: ABNORMAL
CHOLEST SERPL-MCNC: 130 MG/DL (ref 0–199)
CHOLESTEROL/HDL RATIO: 3.3
FENTANYL+NORFENTANYL UR QL SCN: ABNORMAL
HDLC SERPL-MCNC: 39.9 MG/DL
LDLC SERPL CALC-MCNC: 61 MG/DL
METHADONE UR QL SCN: ABNORMAL
NON HDL CHOLESTEROL: 90 MG/DL (ref 0–149)
OPIATES UR QL SCN: ABNORMAL
OXYCODONE+OXYMORPHONE UR QL SCN: ABNORMAL
PCP UR QL SCN: ABNORMAL
TRIGL SERPL-MCNC: 145 MG/DL (ref 0–149)
VLDL: 29 MG/DL (ref 0–40)

## 2024-08-07 PROCEDURE — 80307 DRUG TEST PRSMV CHEM ANLYZR: CPT

## 2024-08-07 PROCEDURE — 80349 CANNABINOIDS NATURAL: CPT

## 2024-08-07 PROCEDURE — 36415 COLL VENOUS BLD VENIPUNCTURE: CPT

## 2024-08-07 PROCEDURE — 84460 ALANINE AMINO (ALT) (SGPT): CPT

## 2024-08-07 PROCEDURE — 80061 LIPID PANEL: CPT

## 2024-08-07 PROCEDURE — 84450 TRANSFERASE (AST) (SGOT): CPT

## 2024-08-07 RX ORDER — LORAZEPAM 0.5 MG/1
0.5 TABLET ORAL EVERY 6 HOURS PRN
Qty: 30 TABLET | Refills: 0 | Status: SHIPPED | OUTPATIENT
Start: 2024-08-07

## 2024-08-07 RX ORDER — ATORVASTATIN CALCIUM 20 MG/1
20 TABLET, FILM COATED ORAL NIGHTLY
Qty: 30 TABLET | Refills: 0 | Status: SHIPPED | OUTPATIENT
Start: 2024-08-07 | End: 2024-09-06

## 2024-08-11 LAB — CARBOXYTHC UR-MCNC: 67 NG/ML

## 2024-08-13 ENCOUNTER — OFFICE VISIT (OUTPATIENT)
Dept: PULMONOLOGY | Facility: CLINIC | Age: 59
End: 2024-08-13
Payer: COMMERCIAL

## 2024-08-13 ENCOUNTER — HOSPITAL ENCOUNTER (OUTPATIENT)
Dept: CARDIOLOGY | Facility: HOSPITAL | Age: 59
Discharge: HOME | End: 2024-08-13
Payer: COMMERCIAL

## 2024-08-13 ENCOUNTER — OFFICE VISIT (OUTPATIENT)
Dept: CARDIOLOGY | Facility: HOSPITAL | Age: 59
End: 2024-08-13
Payer: COMMERCIAL

## 2024-08-13 VITALS
SYSTOLIC BLOOD PRESSURE: 131 MMHG | TEMPERATURE: 97.6 F | RESPIRATION RATE: 16 BRPM | WEIGHT: 245.7 LBS | DIASTOLIC BLOOD PRESSURE: 81 MMHG | OXYGEN SATURATION: 97 % | HEIGHT: 66 IN | HEART RATE: 59 BPM | BODY MASS INDEX: 39.49 KG/M2

## 2024-08-13 DIAGNOSIS — F17.211 CIGARETTE NICOTINE DEPENDENCE IN REMISSION: ICD-10-CM

## 2024-08-13 DIAGNOSIS — J45.30 MILD PERSISTENT ASTHMA WITHOUT COMPLICATION (HHS-HCC): Primary | ICD-10-CM

## 2024-08-13 DIAGNOSIS — R06.02 SHORTNESS OF BREATH: ICD-10-CM

## 2024-08-13 DIAGNOSIS — I31.9: Primary | ICD-10-CM

## 2024-08-13 PROCEDURE — 1036F TOBACCO NON-USER: CPT | Performed by: INTERNAL MEDICINE

## 2024-08-13 PROCEDURE — 93306 TTE W/DOPPLER COMPLETE: CPT

## 2024-08-13 PROCEDURE — 3079F DIAST BP 80-89 MM HG: CPT | Performed by: NURSE PRACTITIONER

## 2024-08-13 PROCEDURE — 3008F BODY MASS INDEX DOCD: CPT | Performed by: INTERNAL MEDICINE

## 2024-08-13 PROCEDURE — 93306 TTE W/DOPPLER COMPLETE: CPT | Performed by: INTERNAL MEDICINE

## 2024-08-13 PROCEDURE — 3008F BODY MASS INDEX DOCD: CPT | Performed by: NURSE PRACTITIONER

## 2024-08-13 PROCEDURE — 1036F TOBACCO NON-USER: CPT | Performed by: NURSE PRACTITIONER

## 2024-08-13 PROCEDURE — 3075F SYST BP GE 130 - 139MM HG: CPT | Performed by: INTERNAL MEDICINE

## 2024-08-13 PROCEDURE — 3075F SYST BP GE 130 - 139MM HG: CPT | Performed by: NURSE PRACTITIONER

## 2024-08-13 PROCEDURE — 99214 OFFICE O/P EST MOD 30 MIN: CPT | Performed by: INTERNAL MEDICINE

## 2024-08-13 PROCEDURE — 99204 OFFICE O/P NEW MOD 45 MIN: CPT | Performed by: NURSE PRACTITIONER

## 2024-08-13 PROCEDURE — 3079F DIAST BP 80-89 MM HG: CPT | Performed by: INTERNAL MEDICINE

## 2024-08-13 PROCEDURE — 99214 OFFICE O/P EST MOD 30 MIN: CPT | Performed by: NURSE PRACTITIONER

## 2024-08-13 RX ORDER — BUDESONIDE AND FORMOTEROL FUMARATE DIHYDRATE 160; 4.5 UG/1; UG/1
2 AEROSOL RESPIRATORY (INHALATION)
Qty: 10.2 G | Refills: 5 | Status: SHIPPED | OUTPATIENT
Start: 2024-08-13 | End: 2025-02-09

## 2024-08-13 NOTE — PROGRESS NOTES
Patient: Zenia Landrum    [id number]  : 1965 -- AGE 58 y.o.    Provider: SHEREEN Vu     Location [facility]   Service Date: 2024       Department of Medicine  Division of Pulmonary, Critical Care, and Sleep Medicine       [facility]  New Visit Note    HISTORY OF PRESENT ILLNESS     The patient's referring provider is: Anselmo Painter, *    Chief complaint:  Zenia Landrum is a 58 y.o. female who presents to a [facility] for an evaluation with concerns of Asthma (Patient is here for initial visit. Patient states she has occasional shortness of breath on exertion. Patient denies cough. Patient has rescue inhaler using when needed. Patient is a former smoker 40 yrs / ppd quit .  ).     HISTORY OF PRESENT ILLNESS:  Ms. Landrum is a 58 year old female (former smoker, quit 2023, ~20 pack year history) with a PMHx of HTN, POTs, anxiety, GERD, COVID (), NSTEMI 2/2 myocarditis (2023). Here for initial pulmonary evaluation as referral from her cardiologist, Dr. Painter, for asthma/shortness of breath.     I have independently interviewed and examined the patient in the office and reviewed available records.      Current History    On today's visit, the patient reports that generally unless she has an URI she feels her breathing is okay. She denies SOB at rest. She has some STINSON, especially going up stairs but she contributed this to her POTs. Also feels like she has a hard time taking a deep breath in at times. She denies cough or wheezing currently. GERD controlled on omeprazole. Has chronic chest pain, denies change in severity or frequency, follows with cardiology. She denies fever, chills, GI symptoms, appetite change, unexpected weight change, headaches or new weakness.     She is unsure that she was every diangosed with asthma but has an albuterol inhaler. She rarely uses  the inhaler, uses more when she is sick and is unsure if it is helpful. She reports when she gets URI she usually needs to go to the ED for breathing treatments.     No childhood pulmonary issues growing up. No pulmonary hospitalizations. Has never been on home oxygen therapy before.    Sleep: Had a sleep study many years ago, told had MOMO; had CPAP and used for a little while but couldn't tolerate so no longer has. Snores at night, has woke up during the night from snoring/gasping, has daytime drowsiness.       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; Denies known exposures to asbestos, silica, beryllium, chemicals, molds or dusts      Fam Hx:   - father: bladder cancer; lung cancer  - paternal uncle: lung cancer  - maternal grandfather: heart disease  - paternal grandfather: heart disease  - mother: healthy  - sister: healthy     Prior Notes & History       REVIEW OF SYSTEMS     REVIEW OF SYSTEMS  Review of Systems  10-point ROS complete and negative except as documented in HPI    ALLERGIES AND MEDICATIONS     ALLERGIES  Allergies   Allergen Reactions    Amoxicillin Hives and Rash    Penicillins Hives and Rash       MEDICATIONS  Current Outpatient Medications   Medication Sig Dispense Refill    albuterol 90 mcg/actuation inhaler INHALE 2 PUFFS 3 TIMES A DAY 18 g 2    aspirin 81 mg chewable tablet Chew 1 tablet (81 mg) once daily.      atorvastatin (Lipitor) 20 mg tablet Take 1 tablet (20 mg) by mouth once daily at bedtime. 30 tablet 0    carvedilol (Coreg) 6.25 mg tablet Take 1 tablet (6.25 mg) by mouth 2 times a day.      clobetasol (Temovate)  "0.05 % ointment Apply 0.05 Applications topically 2 times a day.      escitalopram (Lexapro) 20 mg tablet TAKE 1 TABLET BY MOUTH EVERY DAY 90 tablet 1    LORazepam (Ativan) 0.5 mg tablet Take 1 tablet (0.5 mg) by mouth every 6 hours if needed (POTS). 30 tablet 0    omeprazole (PriLOSEC) 20 mg DR capsule Take 1 capsule (20 mg) by mouth once daily.       No current facility-administered medications for this visit.       PAST HISTORY     PAST MEDICAL HISTORY  She  has a past medical history of Chronic sinusitis, unspecified, Chronic sinusitis, unspecified, Hypertension, MI (myocardial infarction) (Multi), Other conditions influencing health status, Personal history of other specified conditions, and Personal history of other specified conditions.    PAST SURGICAL HISTORY  Past Surgical History:   Procedure Laterality Date    FOOT SURGERY  06/28/2013    Foot Surgery    HYSTERECTOMY  06/28/2013    Hysterectomy       IMMUNIZATION HISTORY  Immunization History   Administered Date(s) Administered    Pfizer COVID-19 vaccine, Fall 2023, 12 years and older, (30mcg/0.3mL) 10/18/2023    Pfizer COVID-19 vaccine, bivalent, age 12 years and older (30 mcg/0.3 mL) 11/03/2022    Pfizer Gray Cap SARS-CoV-2 05/03/2022    Pfizer Purple Cap SARS-CoV-2 03/23/2021, 04/13/2021, 10/18/2021       PHYSICAL EXAM     VITAL SIGNS: /81   Pulse 59   Temp 36.4 °C (97.6 °F)   Resp 16   Ht 1.676 m (5' 6\")   Wt 111 kg (245 lb 11.2 oz)   SpO2 97%   BMI 39.66 kg/m²      PREVIOUS WEIGHTS:  Wt Readings from Last 3 Encounters:   08/13/24 111 kg (245 lb 11.2 oz)   04/03/24 107 kg (236 lb 4.8 oz)   02/14/24 106 kg (234 lb)       Physical Exam  Vitals reviewed.   Constitutional:       General: She is not in acute distress.     Appearance: She is obese. She is not ill-appearing.   HENT:      Nose: Nose normal.      Mouth/Throat:      Mouth: Mucous membranes are moist.   Eyes:      General: No scleral icterus.  Cardiovascular:      Rate and Rhythm: " Normal rate and regular rhythm.      Pulses: Normal pulses.      Heart sounds: Normal heart sounds. No murmur heard.  Pulmonary:      Effort: Pulmonary effort is normal. No respiratory distress.      Breath sounds: Normal breath sounds. No wheezing or rales.   Musculoskeletal:         General: Normal range of motion.      Right lower leg: No edema.      Left lower leg: Edema (trace ankle (is chronic per patient 2/2 club foot that she had surgery on in the past)) present.   Skin:     General: Skin is warm and dry.   Neurological:      Mental Status: She is alert and oriented to person, place, and time.   Psychiatric:         Mood and Affect: Mood normal.         Behavior: Behavior normal.       RESULTS/DATA     Pulmonary Function Test Results     PFT 8/7/23 -- moderate obstruction with significant BD response, air trapping, DLCO WNL - FEV1 63%, FVC 70%    Chest Radiograph     XR chest 1 view 11/22/2023    Narrative  STUDY:  Chest Radiograph;  11/22/2023 8:14 PM  INDICATION:  Shortness of breath.  COMPARISON:  06/14/2023 XR Chest  ACCESSION NUMBER(S):  [id number]  ORDERING CLINICIAN:  NELIDA COFFMAN  TECHNIQUE:  Frontal chest was obtained at 19:59 hours.  FINDINGS:  CARDIOMEDIASTINAL SILHOUETTE:  Cardiomediastinal silhouette is normal in size and configuration.    LUNGS:  Lungs are clear.  There is no pleural effusion and no evidence of  pneumothorax..    ABDOMEN:  No remarkable upper abdominal findings.    BONES:  No acute osseous changes.    Impression  No acute cardiopulmonary disease.  Signed by Ehsan Bacon MD      Chest CT Scan     CT angio chest w & wo IV contrast 5/18/23 at OSH  RESULT:     Limitations: Suboptimal contrast bolus and mild breathing motion   artifacts.  Streak artifacts from the monitoring EKG wires and from dense   contrast within the SVC..     Evaluation for thromboembolic disease:        - Right heart chambers:  No thromboembolic disease.        - Main pulmonary arteries:  No  thromboembolic disease.        - Lobar pulmonary arteries:  No thromboembolic disease.        - Segmental pulmonary arteries:  No thromboembolic disease.        - Subsegmental pulmonary arteries:  No thromboembolic disease within   some limitations of the exam.        - Additional pulmonary artery findings:  The main pulmonary artery   is normal in caliber.     Lines, tubes, and devices:  None.     Lung parenchyma and airways: Inflammatory airway thickening is seen   throughout both lungs especially in the mid and lower lungs.  Associated   patchy air-trapping is suspected bilaterally.  Subsegmental atelectatic   changes are seen in the mid and lower lungs.  Superimposed inflammatory   changes are not excluded.  An 8 mm nodule is seen in the right lower   lobe, abutting the major fissure-image 157.  This may represent focal   area of atelectasis or intrafissural lymph node.  Nodule of different   etiology is not excluded.  Additional similar sized subpleural right   lower lobe nodule is seen over the right hemidiaphragm-imaged 220.  This   could be atelectatic as well.  Other etiologies are not entirely   excluded.  Few additional tiny nodules are present.     Central airways are patent.     No pleural effusion or pneumothorax     Pleural space:  As above     Lower neck, lymph nodes, and mediastinum:  Esophagus is grossly   unremarkable.  Thyroid is within normal.  Small nonenlarged mediastinal   and hilar nodes are likely reactive.     Heart, pericardium, and thoracic vessels:  Normal sized heart.  No   significant pericardial effusion.  Thoracic aorta and SVC are within   normal in diameter.     Bones and soft tissues:  No mass, fluid collection or axillary adenopathy   within the visualized portion of the chest wall.  Osteopenia and   degenerative changes are seen in the spine.  No discrete focal lytic or   sclerotic osseous lesion.     Upper abdomen:  Limited evaluation due to breathing motion artifacts.     Diffuse fatty infiltration of the liver.     IMPRESSION:     No CT evidence of pulmonary embolism.     Indeterminate pulmonary nodule.  Bilateral basilar atelectasis.  Mild   inflammatory airway disease changes.     Additional nonvascular findings as detailed in the body of the report.       Incidental Finding:  Follow-up   Acuity: Incidental   Findings: Solid: 6-8 mm (multiple nodules)   Routing Code:  RI_1   Recommendation: CT Chest WO IVCON   Time Frame: 3-6 months   Comments: If stable on follow-up imaging, a repeat chest CT exam in 12   months (15 - 18 months from the initial exam) is recommended.       Echocardiogram & Cardiac Studies     5/18/23 OSH   Impression: technically difficult exam d/t suboptimal positionong; L ventricle normal in size with hyperdynamic systolic function (EF 75%), no significant valvular abnormalities       Labwork & Pathology   Complete Blood Count  Lab Results   Component Value Date    WBC 10.2 11/22/2023    HGB 15.5 11/22/2023    HCT 46.4 (H) 11/22/2023    MCV 87 11/22/2023     11/22/2023       Peripheral Eosinophil Count:   Eosinophils Absolute   Date Value   11/22/2023 0.70 x10*3/uL   06/14/2023 0.98 x10E9/L (H)         ASSESSMENT/PLAN     Ms. Landrum is a 58 year old female (former smoker, quit 5/2023, ~20 pack year history) with a PMHx of HTN, POTs, anxiety, GERD, COVID (2022), NSTEMI 2/2 myocarditis (05/2023). Here for initial pulmonary evaluation as referral from her cardiologist, Dr. Painter, for asthma/shortness of breath.     Problem List and Orders  Problem List Items Addressed This Visit    None  Visit Diagnoses         Codes    Mild persistent asthma without complication ([facility])    -  Primary J45.30    Relevant Medications    budesonide-formoteroL (Symbicort) 160-4.5 mcg/actuation inhaler    Cigarette nicotine dependence without complication     F17.210    Relevant Orders    CT lung screening low dose            Assessment and Plan / Recommendations:  1)  Asthma - PFT 8/2023 with moderate obstruction and significant BD response; has STINSON and feels cannot take deep breath; generally requires breathing treatments in ED when she gets URI that causes exacerbation  - start symbicort 2 puffs BID  - continue PRN albuterol    2) Lung nodules - CT 5/2023 at OSH with bilateral lung nodules up to 8mm; former smoker, quit 5/2023 (~20 pack year history)  - ordered LDCT    3) MOMO - reports had sleep study in the past and was diagnosed with MOMO and had CPAP for a little while but unable to tolerate  - deferred referral to sleep medicine at this time as patient seemed overwhelmed after discussing starting asthma treatment and lung nodules on prior CT she was not aware of  - will refer to sleep medicine at next appt     RTC 3 months

## 2024-08-13 NOTE — PATIENT INSTRUCTIONS
"Ms. Zenia Landrum    It was a pleasure to see you in clinic today. We discussed your shortness of breath and breathing test results from last year.    Based on our conversation, here are my recommendations:   - Start Symbicort 2 puffs twice daily, RINSE MOUTH & SPIT AFTER use -- please call if this is to expensive and we can try to find something more affordable  - Continue albuterol Inhaler 1-2 puffs every 4-6 hours as needed for shortness of breath. You can also take this 10-15 minutes prior to exertional activity to help \"prime\" your lungs.  - I ordered a CT chest to follow-up on lung nodules found on your CT 05/2023. Please schedule and complete this.    Thank you for visiting the Pulmonary clinic today!   Return to clinic 3 months or sooner if needed   Guera Pollack CNP  My office number is [phone] - Fanta is my  and Mami is my nurse.     [phone] - scheduling    Any test results will be discussed at next visit -- please make sure to make a follow up appt after testing.      "

## 2024-08-14 VITALS
DIASTOLIC BLOOD PRESSURE: 80 MMHG | WEIGHT: 245 LBS | HEART RATE: 68 BPM | SYSTOLIC BLOOD PRESSURE: 135 MMHG | BODY MASS INDEX: 39.37 KG/M2 | HEIGHT: 66 IN

## 2024-08-14 LAB
AORTIC VALVE MEAN GRADIENT: 4 MMHG
AORTIC VALVE PEAK VELOCITY: 1.57 M/S
AV PEAK GRADIENT: 9.9 MMHG
EJECTION FRACTION APICAL 4 CHAMBER: 63.5
EJECTION FRACTION: 72 %
LEFT ATRIUM VOLUME AREA LENGTH INDEX BSA: 22.6 ML/M2
LEFT VENTRICLE INTERNAL DIMENSION DIASTOLE: 5.21 CM (ref 3.5–6)
MITRAL VALVE E/A RATIO: 1.47
RIGHT VENTRICLE FREE WALL PEAK S': 11.5 CM/S
RIGHT VENTRICLE PEAK SYSTOLIC PRESSURE: 11.3 MMHG
TRICUSPID ANNULAR PLANE SYSTOLIC EXCURSION: 1.8 CM

## 2024-08-14 NOTE — PROGRESS NOTES
Subjective:  Patient returns for a routine 4-month follow-up.  I was pleased to see that she remains clinically stable.  She is not having any recurrent chest pain or dyspnea to suggest recurrent myopericarditis.    She continues to struggle with her carpal tunnel syndrome but has not had surgery as of yet.    She denies any palpitations, presyncope or syncope.  She denies any new health concerns and has not had any hospitalizations.  She overall is generally happy and comfortable with how she is doing.  She is taking her chronic medications compliantly and is tolerating all of them well.    Objective:  General: Alert, usual pleasant self.  HEENT: Unchanged.  Lungs: Clear without crackles or wheezing.  Cardiac: Distant heart tones without change.  Abdomen: Nontender.  Extremities: No edema.  Skin: No rash.  Neuro: Grossly intact.    Lipid panel: Cholesterol-130, HDL-40, LDL-61, TG-145.    Impression/plan:  Zenia is doing well at this time from a cardiovascular standpoint.  I will review her echo results from today with her when they are available to make sure were not dealing with any recurrent pericardial process or other issues of concern.    Her blood pressure and heart rate remain under good control on monotherapy with carvedilol so we will continue this unchanged.    Her lipid panel looks excellent on low-dose atorvastatin.    Given her clinical stability we will back her visits off to every 6 months.  She knows to call for any recurrent, concerning symptomatology.    Patient instructions:    Continue current medications unchanged.    We will call you with your echo results when they are available.    Return to clinic in 6 months.

## 2024-08-16 DIAGNOSIS — E78.2 MIXED HYPERLIPIDEMIA: ICD-10-CM

## 2024-08-16 RX ORDER — ATORVASTATIN CALCIUM 20 MG/1
20 TABLET, FILM COATED ORAL NIGHTLY
Qty: 90 TABLET | Refills: 0 | Status: SHIPPED | OUTPATIENT
Start: 2024-08-16 | End: 2024-11-14

## 2024-09-03 ENCOUNTER — HOSPITAL ENCOUNTER (OUTPATIENT)
Dept: RADIOLOGY | Facility: CLINIC | Age: 59
Discharge: HOME | End: 2024-09-03
Payer: COMMERCIAL

## 2024-09-03 DIAGNOSIS — F17.211 CIGARETTE NICOTINE DEPENDENCE IN REMISSION: ICD-10-CM

## 2024-09-03 PROCEDURE — 71271 CT THORAX LUNG CANCER SCR C-: CPT

## 2024-11-13 ENCOUNTER — APPOINTMENT (OUTPATIENT)
Dept: PULMONOLOGY | Facility: CLINIC | Age: 59
End: 2024-11-13
Payer: COMMERCIAL

## 2024-12-18 DIAGNOSIS — E78.2 MIXED HYPERLIPIDEMIA: ICD-10-CM

## 2024-12-18 RX ORDER — ATORVASTATIN CALCIUM 20 MG/1
20 TABLET, FILM COATED ORAL NIGHTLY
Qty: 90 TABLET | Refills: 0 | Status: SHIPPED | OUTPATIENT
Start: 2024-12-18 | End: 2025-03-18

## 2025-02-25 ENCOUNTER — OFFICE VISIT (OUTPATIENT)
Dept: CARDIOLOGY | Facility: HOSPITAL | Age: 60
End: 2025-02-25
Payer: COMMERCIAL

## 2025-02-25 VITALS
DIASTOLIC BLOOD PRESSURE: 85 MMHG | HEIGHT: 66 IN | HEART RATE: 66 BPM | WEIGHT: 259 LBS | SYSTOLIC BLOOD PRESSURE: 125 MMHG | BODY MASS INDEX: 41.62 KG/M2

## 2025-02-25 DIAGNOSIS — G90.A POTS (POSTURAL ORTHOSTATIC TACHYCARDIA SYNDROME): Primary | ICD-10-CM

## 2025-02-25 LAB
ATRIAL RATE: 66 BPM
P AXIS: 19 DEGREES
P OFFSET: 188 MS
P ONSET: 133 MS
PR INTERVAL: 176 MS
Q ONSET: 221 MS
QRS COUNT: 11 BEATS
QRS DURATION: 66 MS
QT INTERVAL: 378 MS
QTC CALCULATION(BAZETT): 396 MS
QTC FREDERICIA: 390 MS
R AXIS: 67 DEGREES
T AXIS: 60 DEGREES
T OFFSET: 410 MS
VENTRICULAR RATE: 66 BPM

## 2025-02-25 PROCEDURE — 1036F TOBACCO NON-USER: CPT | Performed by: INTERNAL MEDICINE

## 2025-02-25 PROCEDURE — 3074F SYST BP LT 130 MM HG: CPT | Performed by: INTERNAL MEDICINE

## 2025-02-25 PROCEDURE — 99214 OFFICE O/P EST MOD 30 MIN: CPT | Mod: 25 | Performed by: INTERNAL MEDICINE

## 2025-02-25 PROCEDURE — 93010 ELECTROCARDIOGRAM REPORT: CPT | Performed by: INTERNAL MEDICINE

## 2025-02-25 PROCEDURE — 93005 ELECTROCARDIOGRAM TRACING: CPT | Performed by: INTERNAL MEDICINE

## 2025-02-25 PROCEDURE — 3008F BODY MASS INDEX DOCD: CPT | Performed by: INTERNAL MEDICINE

## 2025-02-25 PROCEDURE — 99214 OFFICE O/P EST MOD 30 MIN: CPT | Performed by: INTERNAL MEDICINE

## 2025-02-25 PROCEDURE — 3079F DIAST BP 80-89 MM HG: CPT | Performed by: INTERNAL MEDICINE

## 2025-02-25 ASSESSMENT — ENCOUNTER SYMPTOMS
OCCASIONAL FEELINGS OF UNSTEADINESS: 0
LOSS OF SENSATION IN FEET: 0
DEPRESSION: 0

## 2025-02-25 NOTE — PROGRESS NOTES
Subjective:  Patient returns for a routine 6-month follow-up.  We follow her for hypertension, hyperlipidemia, and a likely bout of pericarditis several years ago.    I was pleased to see that she has not had any hospitalizations since her last visit.  She denies any recurrent chest discomfort.  She has started a semaglutide injection to help with weight loss.  She is trying to exercise a bit more.    She remains compliant with her carvedilol and says her blood pressures are fair but occasionally mildly elevated.  She also remains compliant with her atorvastatin.    She denies any other new health concerns and did not need any prescriptions renewed.    Objective:  General: Alert, usual pleasant self.  HEENT: Unchanged.  Lungs: Clear without crackles or wheezing.  Cardiac: Distant heart tones without change.  Abdomen: Nontender.  Extremities: No edema.  Skin: No acute rash.  Neuro: Grossly intact and unchanged.    EKG: Normal sinus rhythm.  Low voltage.  No acute changes.    Lipid panel: Cholesterol-130, HDL-40, LDL-61, TG-145.    Impression/plan:  Zenia is generally doing relatively well at this time.  I was pleased to see that her echo last summer generally looked quite good with preserved LV and RV function, no significant valve disease, and no pulmonary hypertension.  There was also no pericardial effusion.    Her blood pressure is under reasonable control at this time on monotherapy with carvedilol, so we will continue this unchanged.    Her lipid panel looks excellent on low-dose atorvastatin, so we will also continue this unchanged.    I encouraged her to continue to escalate her activity level and to continue to try to work on her weight loss program including medications if this is effective.  Given her clinical stability, I will see her back in 1 year, but she knows to call for any intercurrent concerns.    Patient instructions:    Continue current medications unchanged.    Return to clinic in 1  year.    Call for any intercurrent problems.

## 2025-03-03 DIAGNOSIS — F41.9 ANXIETY: ICD-10-CM

## 2025-03-04 DIAGNOSIS — F41.9 ANXIETY: ICD-10-CM

## 2025-03-04 RX ORDER — ESCITALOPRAM OXALATE 20 MG/1
20 TABLET ORAL DAILY
Qty: 90 TABLET | Refills: 1 | OUTPATIENT
Start: 2025-03-04

## 2025-03-04 RX ORDER — ESCITALOPRAM OXALATE 20 MG/1
20 TABLET ORAL DAILY
Qty: 30 TABLET | Refills: 0 | Status: SHIPPED | OUTPATIENT
Start: 2025-03-04 | End: 2025-04-03

## 2025-03-25 ENCOUNTER — APPOINTMENT (OUTPATIENT)
Dept: PRIMARY CARE | Facility: CLINIC | Age: 60
End: 2025-03-25
Payer: COMMERCIAL

## 2025-03-25 VITALS
DIASTOLIC BLOOD PRESSURE: 70 MMHG | OXYGEN SATURATION: 97 % | BODY MASS INDEX: 39.37 KG/M2 | WEIGHT: 245 LBS | HEART RATE: 66 BPM | HEIGHT: 66 IN | SYSTOLIC BLOOD PRESSURE: 112 MMHG

## 2025-03-25 DIAGNOSIS — Z11.59 ENCOUNTER FOR HEPATITIS C SCREENING TEST FOR LOW RISK PATIENT: ICD-10-CM

## 2025-03-25 DIAGNOSIS — J45.30 MILD PERSISTENT ASTHMA WITHOUT COMPLICATION (HHS-HCC): ICD-10-CM

## 2025-03-25 DIAGNOSIS — F41.9 ANXIETY: ICD-10-CM

## 2025-03-25 DIAGNOSIS — E78.2 MIXED HYPERLIPIDEMIA: ICD-10-CM

## 2025-03-25 DIAGNOSIS — G90.A POTS (POSTURAL ORTHOSTATIC TACHYCARDIA SYNDROME): ICD-10-CM

## 2025-03-25 DIAGNOSIS — Z00.00 ENCOUNTER FOR PREVENTIVE HEALTH EXAMINATION: Primary | ICD-10-CM

## 2025-03-25 DIAGNOSIS — Z12.31 VISIT FOR SCREENING MAMMOGRAM: ICD-10-CM

## 2025-03-25 DIAGNOSIS — Z13.9 SCREENING FOR CONDITION: ICD-10-CM

## 2025-03-25 PROCEDURE — 1036F TOBACCO NON-USER: CPT | Performed by: STUDENT IN AN ORGANIZED HEALTH CARE EDUCATION/TRAINING PROGRAM

## 2025-03-25 PROCEDURE — 3078F DIAST BP <80 MM HG: CPT | Performed by: STUDENT IN AN ORGANIZED HEALTH CARE EDUCATION/TRAINING PROGRAM

## 2025-03-25 PROCEDURE — 99396 PREV VISIT EST AGE 40-64: CPT | Performed by: STUDENT IN AN ORGANIZED HEALTH CARE EDUCATION/TRAINING PROGRAM

## 2025-03-25 PROCEDURE — 3074F SYST BP LT 130 MM HG: CPT | Performed by: STUDENT IN AN ORGANIZED HEALTH CARE EDUCATION/TRAINING PROGRAM

## 2025-03-25 PROCEDURE — 99214 OFFICE O/P EST MOD 30 MIN: CPT | Performed by: STUDENT IN AN ORGANIZED HEALTH CARE EDUCATION/TRAINING PROGRAM

## 2025-03-25 PROCEDURE — 3008F BODY MASS INDEX DOCD: CPT | Performed by: STUDENT IN AN ORGANIZED HEALTH CARE EDUCATION/TRAINING PROGRAM

## 2025-03-25 RX ORDER — LORAZEPAM 0.5 MG/1
0.5 TABLET ORAL EVERY 6 HOURS PRN
Qty: 30 TABLET | Refills: 0 | Status: SHIPPED | OUTPATIENT
Start: 2025-03-25

## 2025-03-25 RX ORDER — ESCITALOPRAM OXALATE 20 MG/1
20 TABLET ORAL DAILY
Qty: 90 TABLET | Refills: 1 | Status: SHIPPED | OUTPATIENT
Start: 2025-03-25 | End: 2025-09-21

## 2025-03-25 RX ORDER — FLUTICASONE PROPIONATE AND SALMETEROL 250; 50 UG/1; UG/1
1 POWDER RESPIRATORY (INHALATION)
Qty: 60 EACH | Refills: 3 | Status: SHIPPED | OUTPATIENT
Start: 2025-03-25

## 2025-03-25 RX ORDER — BUDESONIDE AND FORMOTEROL FUMARATE DIHYDRATE 160; 4.5 UG/1; UG/1
2 AEROSOL RESPIRATORY (INHALATION)
Qty: 10.2 G | Refills: 5 | Status: SHIPPED | OUTPATIENT
Start: 2025-03-25 | End: 2025-03-25

## 2025-03-25 RX ORDER — ATORVASTATIN CALCIUM 20 MG/1
20 TABLET, FILM COATED ORAL NIGHTLY
Qty: 90 TABLET | Refills: 1 | Status: SHIPPED | OUTPATIENT
Start: 2025-03-25 | End: 2025-09-21

## 2025-03-25 ASSESSMENT — PATIENT HEALTH QUESTIONNAIRE - PHQ9
8. MOVING OR SPEAKING SO SLOWLY THAT OTHER PEOPLE COULD HAVE NOTICED. OR THE OPPOSITE, BEING SO FIGETY OR RESTLESS THAT YOU HAVE BEEN MOVING AROUND A LOT MORE THAN USUAL: NOT AT ALL
6. FEELING BAD ABOUT YOURSELF - OR THAT YOU ARE A FAILURE OR HAVE LET YOURSELF OR YOUR FAMILY DOWN: NOT AT ALL
7. TROUBLE CONCENTRATING ON THINGS, SUCH AS READING THE NEWSPAPER OR WATCHING TELEVISION: NOT AT ALL
4. FEELING TIRED OR HAVING LITTLE ENERGY: NOT AT ALL
SUM OF ALL RESPONSES TO PHQ QUESTIONS 1-9: 3
9. THOUGHTS THAT YOU WOULD BE BETTER OFF DEAD, OR OF HURTING YOURSELF: NOT AT ALL
SUM OF ALL RESPONSES TO PHQ9 QUESTIONS 1 AND 2: 3
3. TROUBLE FALLING OR STAYING ASLEEP OR SLEEPING TOO MUCH: NOT AT ALL
5. POOR APPETITE OR OVEREATING: NOT AT ALL
2. FEELING DOWN, DEPRESSED OR HOPELESS: NEARLY EVERY DAY
1. LITTLE INTEREST OR PLEASURE IN DOING THINGS: NOT AT ALL
10. IF YOU CHECKED OFF ANY PROBLEMS, HOW DIFFICULT HAVE THESE PROBLEMS MADE IT FOR YOU TO DO YOUR WORK, TAKE CARE OF THINGS AT HOME, OR GET ALONG WITH OTHER PEOPLE: NOT DIFFICULT AT ALL

## 2025-03-25 ASSESSMENT — ANXIETY QUESTIONNAIRES
3. WORRYING TOO MUCH ABOUT DIFFERENT THINGS: NOT AT ALL
6. BECOMING EASILY ANNOYED OR IRRITABLE: NOT AT ALL
5. BEING SO RESTLESS THAT IT IS HARD TO SIT STILL: NOT AT ALL
4. TROUBLE RELAXING: NOT AT ALL
2. NOT BEING ABLE TO STOP OR CONTROL WORRYING: NOT AT ALL
7. FEELING AFRAID AS IF SOMETHING AWFUL MIGHT HAPPEN: NOT AT ALL
IF YOU CHECKED OFF ANY PROBLEMS ON THIS QUESTIONNAIRE, HOW DIFFICULT HAVE THESE PROBLEMS MADE IT FOR YOU TO DO YOUR WORK, TAKE CARE OF THINGS AT HOME, OR GET ALONG WITH OTHER PEOPLE: NOT DIFFICULT AT ALL
1. FEELING NERVOUS, ANXIOUS, OR ON EDGE: SEVERAL DAYS
GAD7 TOTAL SCORE: 1

## 2025-03-25 NOTE — PROGRESS NOTES
Zenia Landrum is a 59 y.o. year old female presenting for 6 month f/up       HPI:  1. Health maintenance  Screening:   -Colonoscopy: Needs to schedule for scope. Last scope was done on 06/25/2019, per patient, she was told by GI that she needed to schedule for 5 years.   -Mammogram: She is overdue for mammogram. She has requested for a referral. She mentioned monthly self-exams on her breasts.   Diet: balanced carbohydrates, proteins, fats.   Exercise: sedentary with minor activity.   EtOH: none  Marijuana: smokes 3-4x/ week.     She is due for refill of her Ativan 0.5 mg as needed when she is having bad hot symptoms.  It has been one of the only things that has been helpful for her POTS symptoms and she really takes it sparingly when her symptoms are severe.  Her last refill for 30 tablets lasted her several months.  Reports no side effects to the medication up to this point.    2. Anxiety  She was taking Lexapro 20 mg daily for anxiety control, but she discontinued the medication about three weeks ago due to not having a refill. She mentioned tapering the drug before officially running out. She denies any serotonin related symptoms post-medication withdrawal.  She mentioned having depression, low libido, numbness, but mentioned she felt the same way before,during, and after the lexapro. She would like to talk about another possible anxiolytic.     3. GERD  Symptoms are well controlled. She is still taking the omeprazole as prescribed. She is requesting a referral for an EGD.   No unintentional weight loss or trouble swallowing. No N/V.     ROS:   All pertinent positive symptoms are included in history of present illness.    All other systems have been reviewed and are negative and noncontributory to this patient's current ailments.    Current Outpatient Medications   Medication Sig Dispense Refill    albuterol 90 mcg/actuation inhaler INHALE 2 PUFFS 3 TIMES A DAY 18 g 2    aspirin 81 mg chewable tablet Chew 1  "tablet (81 mg) once daily.      carvedilol (Coreg) 6.25 mg tablet Take 1 tablet (6.25 mg) by mouth 2 times a day.      clobetasol (Temovate) 0.05 % ointment Apply 0.05 Applications topically 2 times a day.      omeprazole (PriLOSEC) 20 mg DR capsule Take 1 capsule (20 mg) by mouth once daily.      atorvastatin (Lipitor) 20 mg tablet Take 1 tablet (20 mg) by mouth once daily at bedtime. 90 tablet 1    budesonide-formoterol (Symbicort) 160-4.5 mcg/actuation inhaler Inhale 2 puffs 2 times a day. Rinse mouth with water after use to reduce aftertaste and incidence of candidiasis. Do not swallow. 10.2 g 5    escitalopram (Lexapro) 20 mg tablet Take 1 tablet (20 mg) by mouth once daily. 90 tablet 1    LORazepam (Ativan) 0.5 mg tablet Take 1 tablet (0.5 mg) by mouth every 6 hours if needed (POTS). 30 tablet 0     No current facility-administered medications for this visit.       OBJECTIVE  Visit Vitals  /70   Pulse 66   Ht 1.676 m (5' 6\")   Wt 111 kg (245 lb)   SpO2 97%   BMI 39.54 kg/m²   OB Status Hysterectomy   Smoking Status Former   BSA 2.27 m²        Physical Exam:  GENERAL: Alert, oriented, pleasant, in no acute distress  HEENT: Head normocephalic, atraumatic  CV: Heart with regular rate and rhythm, normal S1/S2, no murmurs; normal peripheral pulses- radial and dorsalis pedis palpable  LUNGS: CTAB without wheezing, rhonchi or rales; good respiratory effort, no increased work of breathing  ABDOMEN: soft, non-tender, non-distended, no masses appreciated; +BS in all four quadrants  EXTREMITIES: no edema, no cyanosis  PSYCH: Appropriate mood and affect  SKIN: No rashes or lesions appreciated    Assessment/Plan   Diagnoses and all orders for this visit:  Encounter for preventive health examination  A complete history and physical was performed today.  Vaccines are up to date.  Colon cancer screening is up to date.  Mammogram ordered  PAP is up to date.  Fasting labs ordered today include:  -     CBC; Future  -     " Comprehensive Metabolic Panel; Future  -     Hemoglobin A1C; Future  -     TSH with reflex to Free T4 if abnormal; Future  Encounter for hepatitis C screening test for low risk patient  -     Hepatitis C Antibody; Future  Visit for screening mammogram  -     BI mammo bilateral screening tomosynthesis; Future  Screening for condition  -     CBC; Future  -     Comprehensive Metabolic Panel; Future  -     Hemoglobin A1C; Future  -     TSH with reflex to Free T4 if abnormal; Future  -     Hepatitis C Antibody; Future  Mixed hyperlipidemia  -     Comprehensive Metabolic Panel; Future  -     Refill atorvastatin (Lipitor) 20 mg tablet; Take 1 tablet (20 mg) by mouth once daily at bedtime.  Anxiety  -     Refill escitalopram (Lexapro) 20 mg tablet; Take 1 tablet (20 mg) by mouth once daily.  Mild persistent asthma without complication ([facility])  Stable on current inhalers, refills provided  -     budesonide-formoterol (Symbicort) 160-4.5 mcg/actuation inhaler; Inhale 2 puffs 2 times a day. Rinse mouth with water after use to reduce aftertaste and incidence of candidiasis. Do not swallow.  POTS (postural orthostatic tachycardia syndrome)  CSA and UDS up to date, OARRS appropriate, uses sparingly  -     LORazepam (Ativan) 0.5 mg tablet; Take 1 tablet (0.5 mg) by mouth every 6 hours if needed (POTS).    Return in 6 months

## 2025-04-17 ENCOUNTER — HOSPITAL ENCOUNTER (OUTPATIENT)
Dept: RADIOLOGY | Facility: CLINIC | Age: 60
Discharge: HOME | End: 2025-04-17
Payer: COMMERCIAL

## 2025-04-17 DIAGNOSIS — Z12.31 VISIT FOR SCREENING MAMMOGRAM: ICD-10-CM

## 2025-04-17 PROCEDURE — 77067 SCR MAMMO BI INCL CAD: CPT

## 2025-04-17 PROCEDURE — 77063 BREAST TOMOSYNTHESIS BI: CPT | Performed by: RADIOLOGY

## 2025-04-17 PROCEDURE — 77067 SCR MAMMO BI INCL CAD: CPT | Performed by: RADIOLOGY

## 2025-04-18 DIAGNOSIS — R92.8 ABNORMAL MAMMOGRAM OF LEFT BREAST: Primary | ICD-10-CM

## 2025-04-18 LAB
ALBUMIN SERPL-MCNC: 4.4 G/DL (ref 3.6–5.1)
ALP SERPL-CCNC: 75 U/L (ref 37–153)
ALT SERPL-CCNC: 18 U/L (ref 6–29)
ANION GAP SERPL CALCULATED.4IONS-SCNC: 12 MMOL/L (CALC) (ref 7–17)
AST SERPL-CCNC: 17 U/L (ref 10–35)
BILIRUB SERPL-MCNC: 0.6 MG/DL (ref 0.2–1.2)
BUN SERPL-MCNC: 11 MG/DL (ref 7–25)
CALCIUM SERPL-MCNC: 9.4 MG/DL (ref 8.6–10.4)
CHLORIDE SERPL-SCNC: 108 MMOL/L (ref 98–110)
CO2 SERPL-SCNC: 22 MMOL/L (ref 20–32)
CREAT SERPL-MCNC: 0.61 MG/DL (ref 0.5–1.05)
EGFRCR SERPLBLD CKD-EPI 2021: 102 ML/MIN/1.73M2
ERYTHROCYTE [DISTWIDTH] IN BLOOD BY AUTOMATED COUNT: 13.2 % (ref 11–15)
EST. AVERAGE GLUCOSE BLD GHB EST-MCNC: 111 MG/DL
EST. AVERAGE GLUCOSE BLD GHB EST-SCNC: 6.2 MMOL/L
GLUCOSE SERPL-MCNC: 95 MG/DL (ref 65–99)
HBA1C MFR BLD: 5.5 %
HCT VFR BLD AUTO: 44.4 % (ref 35–45)
HCV AB SERPL QL IA: NORMAL
HGB BLD-MCNC: 15 G/DL (ref 11.7–15.5)
MCH RBC QN AUTO: 29.9 PG (ref 27–33)
MCHC RBC AUTO-ENTMCNC: 33.8 G/DL (ref 32–36)
MCV RBC AUTO: 88.6 FL (ref 80–100)
PLATELET # BLD AUTO: 279 THOUSAND/UL (ref 140–400)
PMV BLD REES-ECKER: 12.1 FL (ref 7.5–12.5)
POTASSIUM SERPL-SCNC: 4.3 MMOL/L (ref 3.5–5.3)
PROT SERPL-MCNC: 6.6 G/DL (ref 6.1–8.1)
RBC # BLD AUTO: 5.01 MILLION/UL (ref 3.8–5.1)
SODIUM SERPL-SCNC: 142 MMOL/L (ref 135–146)
TSH SERPL-ACNC: 0.99 MIU/L (ref 0.4–4.5)
WBC # BLD AUTO: 9.2 THOUSAND/UL (ref 3.8–10.8)

## 2025-04-23 ENCOUNTER — HOSPITAL ENCOUNTER (OUTPATIENT)
Dept: RADIOLOGY | Facility: CLINIC | Age: 60
Discharge: HOME | End: 2025-04-23
Payer: COMMERCIAL

## 2025-04-23 DIAGNOSIS — R92.8 ABNORMAL MAMMOGRAM OF LEFT BREAST: ICD-10-CM

## 2025-04-23 PROCEDURE — 77065 DX MAMMO INCL CAD UNI: CPT | Mod: LT

## 2025-04-23 PROCEDURE — 77065 DX MAMMO INCL CAD UNI: CPT | Mod: LEFT SIDE | Performed by: RADIOLOGY

## 2025-04-29 ENCOUNTER — APPOINTMENT (OUTPATIENT)
Dept: PRIMARY CARE | Facility: CLINIC | Age: 60
End: 2025-04-29
Payer: COMMERCIAL

## 2025-04-29 PROBLEM — R92.1 BREAST CALCIFICATIONS ON MAMMOGRAM: Status: ACTIVE | Noted: 2025-04-29

## 2025-04-29 PROBLEM — R92.8 ABNORMAL FINDING ON BREAST IMAGING: Status: ACTIVE | Noted: 2025-04-29

## 2025-04-30 ENCOUNTER — HOSPITAL ENCOUNTER (OUTPATIENT)
Dept: RADIOLOGY | Facility: CLINIC | Age: 60
Discharge: HOME | End: 2025-04-30
Payer: COMMERCIAL

## 2025-04-30 ENCOUNTER — PROCEDURE VISIT (OUTPATIENT)
Dept: SURGICAL ONCOLOGY | Facility: CLINIC | Age: 60
End: 2025-04-30
Payer: COMMERCIAL

## 2025-04-30 VITALS
DIASTOLIC BLOOD PRESSURE: 64 MMHG | TEMPERATURE: 97.4 F | OXYGEN SATURATION: 95 % | BODY MASS INDEX: 38.74 KG/M2 | WEIGHT: 240 LBS | HEART RATE: 70 BPM | SYSTOLIC BLOOD PRESSURE: 107 MMHG

## 2025-04-30 DIAGNOSIS — R92.1 BREAST CALCIFICATIONS: ICD-10-CM

## 2025-04-30 DIAGNOSIS — E78.2 MIXED HYPERLIPIDEMIA: ICD-10-CM

## 2025-04-30 DIAGNOSIS — K21.9 GASTROESOPHAGEAL REFLUX DISEASE, UNSPECIFIED WHETHER ESOPHAGITIS PRESENT: ICD-10-CM

## 2025-04-30 DIAGNOSIS — J45.30 MILD PERSISTENT ASTHMA WITHOUT COMPLICATION (HHS-HCC): ICD-10-CM

## 2025-04-30 DIAGNOSIS — R92.8 ABNORMAL FINDING ON BREAST IMAGING: ICD-10-CM

## 2025-04-30 DIAGNOSIS — R92.1 BREAST CALCIFICATIONS ON MAMMOGRAM: Primary | ICD-10-CM

## 2025-04-30 DIAGNOSIS — I25.10 MILD CAD: ICD-10-CM

## 2025-04-30 DIAGNOSIS — E66.813 OBESITY, CLASS III, BMI 40-49.9 (MORBID OBESITY): ICD-10-CM

## 2025-04-30 DIAGNOSIS — I10 PRIMARY HYPERTENSION: ICD-10-CM

## 2025-04-30 DIAGNOSIS — R73.03 PREDIABETES: ICD-10-CM

## 2025-04-30 DIAGNOSIS — G47.33 OBSTRUCTIVE SLEEP APNEA (ADULT) (PEDIATRIC): ICD-10-CM

## 2025-04-30 DIAGNOSIS — G90.A POTS (POSTURAL ORTHOSTATIC TACHYCARDIA SYNDROME): ICD-10-CM

## 2025-04-30 DIAGNOSIS — R92.8 OTHER ABNORMAL AND INCONCLUSIVE FINDINGS ON DIAGNOSTIC IMAGING OF BREAST: ICD-10-CM

## 2025-04-30 PROCEDURE — 19081 BX BREAST 1ST LESION STRTCTC: CPT | Mod: LEFT SIDE | Performed by: STUDENT IN AN ORGANIZED HEALTH CARE EDUCATION/TRAINING PROGRAM

## 2025-04-30 PROCEDURE — 99204 OFFICE O/P NEW MOD 45 MIN: CPT | Performed by: SURGERY

## 2025-04-30 PROCEDURE — 2780000003 HC OR 278 NO HCPCS

## 2025-04-30 PROCEDURE — 99214 OFFICE O/P EST MOD 30 MIN: CPT | Performed by: SURGERY

## 2025-04-30 PROCEDURE — C1739 HC OR 278 NO HCPCS: HCPCS

## 2025-04-30 PROCEDURE — 2500000004 HC RX 250 GENERAL PHARMACY W/ HCPCS (ALT 636 FOR OP/ED): Mod: JZ | Performed by: STUDENT IN AN ORGANIZED HEALTH CARE EDUCATION/TRAINING PROGRAM

## 2025-04-30 PROCEDURE — 77065 DX MAMMO INCL CAD UNI: CPT | Mod: LEFT SIDE | Performed by: STUDENT IN AN ORGANIZED HEALTH CARE EDUCATION/TRAINING PROGRAM

## 2025-04-30 PROCEDURE — 2720000007 HC OR 272 NO HCPCS

## 2025-04-30 PROCEDURE — 19081 BX BREAST 1ST LESION STRTCTC: CPT | Mod: LT

## 2025-04-30 RX ADMIN — Medication 10 ML: at 13:40

## 2025-04-30 ASSESSMENT — PAIN SCALES - GENERAL
PAINLEVEL_OUTOF10: 0 - NO PAIN
PAINLEVEL_OUTOF10: 0-NO PAIN
PAINLEVEL_OUTOF10: 0 - NO PAIN

## 2025-04-30 NOTE — DISCHARGE INSTRUCTIONS
AFTER THE TEST  A steri-strip and bandage will be placed over the incision. You may shower after 24 hours. Remove bandage after 24 hours. Remove bandage after the shower. Leave the steri-strips in place to fall off on their own. If after 1 week the steri-strips are still on, you may remove them. Avoid swimming or soaking in tub for 3 days.     You may have mild discomfort at the test site. If needed, you may take Tylenol (Acetaminophen) for pain. Please avoid taking NSAIDs, Motrin, Advil, Aleve, or ibuprofen for 24 hours following the biopsy. After 24 hours you may resume NSAIDSs.     If you take aspirin, Plavix, Coumadin, Xarelto or Eliquis please tell us. If these medications were stopped by your provider, please ask them when to resume.     You may have some tenderness, bruising or slight bleeding at the site. Please apply ice packs to the site for 15 minutes on and 15 minutes off for a 2 hour minimum.     Most people can return to their usual routine after the procedure. Avoid Strenuous activity for 24 hours.     Sleep in a bra the night after your biopsy. Continue to do so for comfort.     Call your provider if you have any of the following symptoms :  Fever  Increased pain  Increased bleeding  Redness  Increased swelling  Yellowish drainage  Your provider will get the biopsy results within 5 - 7 days. Call your provider with any questions.     Patient education brochure and pain/comfort measures have been reviewed.   Phone number provided to contact [facility] if problems arise.     Patient verbalized understanding of home going instructions.  Patient left at 1405.

## 2025-04-30 NOTE — PROGRESS NOTES
Zenia Landrum female   1965 59 y.o.   [id number]      Chief Complaint    New Patient Visit; Biopsy Consultation         HPI  Zenia Landrum is a 59 y.o. female [facility] by [facility] for [facility] breast calcifications, biopsy consult. She denies breast surgery or biopsy. She has family history of breast and ovarian cancer.     She has not noted any breast masses or nodules, skin or nipple changes, nipple discharge, or axillary lymphadenopathy bilaterally.      BREAST IMAGIN25 Bilateral screening mammogram   FINDINGS:  2D and tomosynthesis images were reviewed at 1 mm slice thickness.      Density:  There are scattered areas of fibroglandular density.      In the central left breast at anterior depth there are indeterminate  calcifications. No suspicious masses or calcifications are identified  in the right breast.      IMPRESSION:  1. Indeterminate left breast calcifications. Additional diagnostic  mammographic views are recommended. An ultrasound may be indicated.  2. No evidence of malignancy in the right breast.      BI-RADS CATEGORY:  BI-RADS Category:  0 Incomplete; Need Additional Imaging     25 Left diagnostic mammogram   In the central left breast at anterior depth there are grouped  pleomorphic calcifications. The group measures 0.8 cm. No suspicious  masses are identified.      IMPRESSION:  Indeterminate left breast calcifications. A stereotactic biopsy is  recommended. This was discussed with the patient at the time of her  visit with Dr. Sheriff. A preprocedure form has been completed.      BI-RADS CATEGORY:  BI-RADS Category:  4 Suspicious.  Recommendation:  Surgical Consultation and Biopsy.  Recommended Date:  Immediate.  Laterality:  Left.      REPRODUCTIVE HISTORY: menarche age 11, , no OCPs, surgical menopause age 42 s/p hysterectomy for fibroids (ovaries intact), no HRT or fertility meds    FAMILY CANCER HISTORY:  Paternal aunt with breast  There is nothing very similar to allopurinol so we need to just find a pharmacy that has it   cancer in her 60s  Maternal cousin with ovarian cancer in her 50s    REVIEW OF SYSTEMS    Constitutional:  Negative for appetite change, fatigue, fever and unexpected weight change.   HENT:  Negative for ear pain, hearing loss, nosebleeds, sore throat and trouble swallowing.    Eyes:  Negative for discharge, itching and visual disturbance.   Respiratory:  Negative for cough, chest tightness and shortness of breath.    Cardiovascular:  Negative for chest pain, palpitations and leg swelling.   Breast: as indicated in HPI  Gastrointestinal:  Positive constipation. Negative for abdominal pain, diarrhea and nausea.   Endocrine: Negative for cold intolerance and heat intolerance.   Genitourinary:  Negative for dysuria, frequency, hematuria, pelvic pain and vaginal bleeding. Positive incontinence.   Musculoskeletal:  Positive for arthralgias, muscle weakness, hip pain, back pain, neck pain, and myalgias. Negative gait problem, joint swelling  Skin:  Negative for color change and rash. Positive itching due to eczema.  Allergic/Immunologic: Negative for environmental allergies and food allergies.   Neurological:  Negative for dizziness, tremors, speech difficulty, weakness, numbness and headaches.   Hematological:  Does not bruise/bleed easily.   Psychiatric/Behavioral:  Negative for agitation, dysphoric mood and sleep disturbance. The patient is not nervous/anxious.         MEDICATIONS  Current Outpatient Medications   Medication Instructions    albuterol 90 mcg/actuation inhaler 2 puffs, inhalation, 3 times daily RT    aspirin 81 mg, Daily RT    atorvastatin (LIPITOR) 20 mg, oral, Nightly    carvedilol (Coreg) 6.25 mg tablet 1 tablet, 2 times daily    clobetasol (Temovate) 0.05 % ointment 0.05 Applications, 2 times daily    escitalopram (LEXAPRO) 20 mg, oral, Daily    fluticasone propion-salmeteroL (Wixela Inhub) 250-50 mcg/dose diskus inhaler 1 puff, inhalation, 2 times daily RT    LORazepam (ATIVAN) 0.5 mg, oral, Every 6  hours PRN    omeprazole (PRILOSEC) 20 mg, Daily RT        ALLERGIES  RX Allergies[1]     Patient Active Problem List    Diagnosis Date Noted    Breast calcifications on mammogram 2025    Abnormal finding on breast imaging 2025    Mild persistent asthma without complication ([facility]) 2025    Mild CAD 2023    Lung nodule 2023    Primary hypertension 2023    Prediabetes 2023    HALLEY (stress urinary incontinence, female) 2021    Urge incontinence 2021    Obesity, Class III, BMI 40-49.9 (morbid obesity) 2019    POTS (postural orthostatic tachycardia syndrome) 2014    Obstructive sleep apnea (adult) (pediatric) 2012    Migraine with vertigo 2012    Vitamin D deficiency 2011    GERD (gastroesophageal reflux disease) 2011    Anxiety 2010    Mixed hyperlipidemia 2007     Medical History[2]   Surgical History[3]   Family History[4]           Social History     Tobacco Use    Smoking status: Former     Current packs/day: 0.00     Average packs/day: 0.5 packs/day for 40.0 years (20.0 ttl pk-yrs)     Types: Cigarettes     Start date: 1983     Quit date: 2023     Years since quittin.0    Smokeless tobacco: Never   Substance Use Topics    Alcohol use: Not Currently    She and her  Greg have an 11 month old merrill retriever named Cris.      VITALS  Vitals:    25 1211   BP: 107/64   Pulse: 70   Temp: 36.3 °C (97.4 °F)   SpO2: 95%        PHYSICAL EXAM  General: Otherwise healthy appearing. No acute distress.     HEENT: Conjunctiva well-colored, sclera non-icteric. Neck supple, trachea midline. No lymphadenopathy or thyromegaly.     Cardiovascular: Regular rate and rhythm.    Respiratory: Clear to auscultation bilaterally.     Breast: Exam performed in the sitting and supine positions. Right breast: No obvious abnormalities palpable. No skin or nipple changes. Left breast: No obvious abnormalities palpable. No  skin or nipple changes. Bra size 44D.     Abdomen: Soft, non-tender, non-distended.    Extremities: Atraumatic, no edema.     Neurologic: Motor and sensory grossly intact. Alert and oriented.     Lymphatic: No axillary, supraclavicular, or infraclavicular lymphadenopathy bilaterally.       IMAGING    Time was spent viewing digital images of the radiology testing with the patient.       ASSESSMENT/PLAN    Left breast calcifications, stereotactic  core needle biopsy      We discussed your symptoms, relevant history, physical exam, and imaging results. I recommended a core needle biopsy. A breast radiologist will perform the biopsy. We discussed the risks, benefits, and alternatives to biopsy. Our discussion included potential diagnosis of benign, atypical, and malignant (cancer) results.     Instructions:   I usually receive biopsy results in 7-10 business days and will relay these by phone. Results are available to patients directly via My Chart, the  electronic health boaz. If you see your results and have not yet heard from me please call my office at [phone] (option #2). We will then discuss recommendations for follow up and plan care accordingly.     If you have any questions or concerns after the biopsy, or if it has been more than one week and you have not received your biopsy results, please call my office.     I appreciate the opportunity to care for you, and look forward to speaking with you soon.     Shonna Rob MD   Breast Surgical Oncology       *DISCLAIMER* Speech recognition software was used to create portions of this document. While an attempt at proofreading has been made, minor errors in transcription may be present.        1. Breast calcifications on mammogram        2. Abnormal finding on breast imaging        3. Mixed hyperlipidemia        4. POTS (postural orthostatic tachycardia syndrome)        5. Primary hypertension        6. Mild CAD        7. Obesity, Class III, BMI 40-49.9  (morbid obesity)        8. Prediabetes        9. Gastroesophageal reflux disease, unspecified whether esophagitis present        10. Mild persistent asthma without complication ([facility])        11. Obstructive sleep apnea (adult) (pediatric)                            [1]   Allergies  Allergen Reactions    Amoxicillin Hives and Rash    Penicillins Hives and Rash   [2]   Past Medical History:  Diagnosis Date    Chronic sinusitis, unspecified     Sinusitis    Chronic sinusitis, unspecified     Sinusitis    Hypertension     MI (myocardial infarction) (Multi)     Other conditions influencing health status     Jaw Pain    Personal history of other specified conditions     History of headache    Personal history of other specified conditions     History of abdominal pain   [3]   Past Surgical History:  Procedure Laterality Date    FOOT SURGERY  06/28/2013    Foot Surgery    HYSTERECTOMY  06/28/2013    Hysterectomy   [4]   Family History  Problem Relation Name Age of Onset    Other (bladder ca) Father  65    Breast cancer Father's Sister  60 - 69    Prostate cancer Maternal Grandfather  70 - 79    Colon cancer Paternal Grandmother  80 - 89    Ovarian cancer Maternal Cousin  50 - 59

## 2025-05-01 ENCOUNTER — TELEPHONE (OUTPATIENT)
Dept: RADIOLOGY | Facility: CLINIC | Age: 60
End: 2025-05-01

## 2025-05-01 NOTE — TELEPHONE ENCOUNTER
Called patient to follow up post biopsy. Patient states she is doing great and has no complaints. Xochilt Tierney RN

## 2025-05-05 ENCOUNTER — TELEPHONE (OUTPATIENT)
Dept: SURGICAL ONCOLOGY | Facility: HOSPITAL | Age: 60
End: 2025-05-05
Payer: COMMERCIAL

## 2025-05-05 DIAGNOSIS — N60.99 ATYPICAL DUCTAL HYPERPLASIA OF BREAST: ICD-10-CM

## 2025-05-05 DIAGNOSIS — R92.1 BREAST CALCIFICATIONS ON MAMMOGRAM: Primary | ICD-10-CM

## 2025-05-05 LAB
LAB AP ASR DISCLAIMER: NORMAL
LABORATORY COMMENT REPORT: NORMAL
PATH REPORT.FINAL DX SPEC: NORMAL
PATH REPORT.GROSS SPEC: NORMAL
PATH REPORT.RELEVANT HX SPEC: NORMAL
PATH REPORT.TOTAL CANCER: NORMAL

## 2025-05-05 NOTE — TELEPHONE ENCOUNTER
I spoke with Zenia by phone to relay results of biopsy which showed atypical ductal hyperplasia. I explained that atypia is not cancer, but increases the risk of breast cancer.  There is also a small the possibility of finding breast cancer associated with the atypia. However, the calcifications were only 8mm and well sampled by core biopsy. We spoke about the options of surgical removal and clinical observation with referral to our high risk program and short interval follow-up mammogram. We agreed on the latter strategy. A referral to high risk was placed and she will follow up in 6 months with same-day mammogram to re-evaluate. All questions answered.      Shonna Rob MD   Breast Surgical Oncology

## 2025-05-07 ENCOUNTER — OFFICE VISIT (OUTPATIENT)
Dept: OBSTETRICS AND GYNECOLOGY | Facility: CLINIC | Age: 60
End: 2025-05-07
Payer: COMMERCIAL

## 2025-05-07 VITALS
DIASTOLIC BLOOD PRESSURE: 60 MMHG | HEIGHT: 66 IN | WEIGHT: 236 LBS | BODY MASS INDEX: 37.93 KG/M2 | SYSTOLIC BLOOD PRESSURE: 127 MMHG

## 2025-05-07 DIAGNOSIS — N89.8 VAGINAL ITCHING: ICD-10-CM

## 2025-05-07 DIAGNOSIS — Z01.419 WOMEN'S ANNUAL ROUTINE GYNECOLOGICAL EXAMINATION: Primary | ICD-10-CM

## 2025-05-07 DIAGNOSIS — N39.41 URGE INCONTINENCE: ICD-10-CM

## 2025-05-07 DIAGNOSIS — F34.1 PERSISTENT DEPRESSIVE DISORDER: ICD-10-CM

## 2025-05-07 PROCEDURE — 3008F BODY MASS INDEX DOCD: CPT | Performed by: ADVANCED PRACTICE MIDWIFE

## 2025-05-07 PROCEDURE — 99386 PREV VISIT NEW AGE 40-64: CPT | Performed by: ADVANCED PRACTICE MIDWIFE

## 2025-05-07 PROCEDURE — 3074F SYST BP LT 130 MM HG: CPT | Performed by: ADVANCED PRACTICE MIDWIFE

## 2025-05-07 PROCEDURE — 3078F DIAST BP <80 MM HG: CPT | Performed by: ADVANCED PRACTICE MIDWIFE

## 2025-05-07 RX ORDER — CLOTRIMAZOLE AND BETAMETHASONE DIPROPIONATE 10; .64 MG/G; MG/G
1 CREAM TOPICAL 2 TIMES DAILY
Qty: 45 G | Refills: 1 | Status: SHIPPED | OUTPATIENT
Start: 2025-05-07 | End: 2025-06-04

## 2025-05-07 ASSESSMENT — SOCIAL DETERMINANTS OF HEALTH (SDOH)
WITHIN THE LAST YEAR, HAVE YOU BEEN AFRAID OF YOUR PARTNER OR EX-PARTNER?: NO
WITHIN THE LAST YEAR, HAVE YOU BEEN KICKED, HIT, SLAPPED, OR OTHERWISE PHYSICALLY HURT BY YOUR PARTNER OR EX-PARTNER?: NO
IN THE PAST 12 MONTHS, HAS THE ELECTRIC, GAS, OIL, OR WATER COMPANY THREATENED TO SHUT OFF SERVICE IN YOUR HOME?: NO
WITHIN THE LAST YEAR, HAVE YOU BEEN HUMILIATED OR EMOTIONALLY ABUSED IN OTHER WAYS BY YOUR PARTNER OR EX-PARTNER?: NO
HOW HARD IS IT FOR YOU TO PAY FOR THE VERY BASICS LIKE FOOD, HOUSING, MEDICAL CARE, AND HEATING?: NOT VERY HARD
WITHIN THE LAST YEAR, HAVE TO BEEN RAPED OR FORCED TO HAVE ANY KIND OF SEXUAL ACTIVITY BY YOUR PARTNER OR EX-PARTNER?: NO

## 2025-05-07 ASSESSMENT — ENCOUNTER SYMPTOMS
DEPRESSION: 0
GASTROINTESTINAL NEGATIVE: 0
ENDOCRINE NEGATIVE: 0
OCCASIONAL FEELINGS OF UNSTEADINESS: 0
LOSS OF SENSATION IN FEET: 0
CONSTITUTIONAL NEGATIVE: 0
MUSCULOSKELETAL NEGATIVE: 0
ALLERGIC/IMMUNOLOGIC NEGATIVE: 0
PSYCHIATRIC NEGATIVE: 0
RESPIRATORY NEGATIVE: 0
HEMATOLOGIC/LYMPHATIC NEGATIVE: 0
CARDIOVASCULAR NEGATIVE: 0
NEUROLOGICAL NEGATIVE: 0
EYES NEGATIVE: 0

## 2025-05-07 ASSESSMENT — LIFESTYLE VARIABLES
HOW MANY STANDARD DRINKS CONTAINING ALCOHOL DO YOU HAVE ON A TYPICAL DAY: PATIENT DOES NOT DRINK
SKIP TO QUESTIONS 9-10: 1
HOW OFTEN DO YOU HAVE SIX OR MORE DRINKS ON ONE OCCASION: NEVER
HOW OFTEN DO YOU HAVE A DRINK CONTAINING ALCOHOL: NEVER
AUDIT-C TOTAL SCORE: 0

## 2025-05-07 ASSESSMENT — PAIN SCALES - GENERAL: PAINLEVEL_OUTOF10: 0-NO PAIN

## 2025-05-07 NOTE — PROGRESS NOTES
Assessment/Plan   Diagnoses and all orders for this visit:  Women's annual routine gynecological examination  Persistent depressive disorder  -     Referral to Psychiatry; Future  Urge incontinence  -     Referral to Urology; Future  Vaginal itching  -     clotrimazole-betamethasone (Lotrisone) cream; Apply 1 Application topically 2 times a day for 28 days.      Daniella Earl, APRN-CNM     Subjective   Zenia Landrum is a 59 y.o. female who presents for annual exam.     Concerns today:  Patient is complaining of urinary incontinence, she was diagnosed with urge incontinence 2 years ago and received treatment. Medications helped for some time but now she can't hold her urine. Symptoms is not related to coughing or sneezing, Declined pain while urination. Referral to urology and pelvic floor physical therapy was placed.     Patient complained of emotional distress and instability. She was on Lexapro for 10 years and stopped it by her self. Explained to the patient that HRT is not option to help with her symptoms due to history of hypertension and cardiac issues and she is experiencing minimal vasomotor symptoms, occasional night seats and hot flashes that is not bothering her.  Discussed different antidepressant medications and her case better to be cared by psychology and psychiatric .  Referral to counseling and psychiatric placed.     Declined vaginal discharge, or foul smelling, but she sometimes experience itchiness and skin irritation. Lotrisone cream is prescribed to help with her symptoms.     GYN screening history: last pap: was normal. The patient is not taking hormone replacement therapy. Patient denies post-menopausal vaginal bleeding..  The patient participates in regular exercise: no. The patient reports that there is not domestic violence in their life.     Sexual Activity: not sexually active  Pain with intercourse? No   Loss of desire? Yes   Able to have an orgasm? No     Last pap: patient is  "uncertain, but hysterectomy was done in   History of abnormal Pap smear: no  Family history of uterine or ovarian cancer: yes - maternal cousin age 50-59yrs    Last mammogram: 2025  History of abnormal mammogram: yes - last mammogram was with abnormal findings(breast calcification), biopsy was done on 2025   Family history of breast cancer: yes - paternal side cousin at age 60-69yrs  Menstrual History:  OB History          0    Para   0    Term   0            AB        Living             SAB        IAB        Ectopic        Multiple        Live Births                    No LMP recorded. Patient has had a hysterectomy.       Objective   /60   Ht 1.676 m (5' 6\")   Wt 107 kg (236 lb)   BMI 38.09 kg/m²   Physical Exam  Genitourinary:      Vulva normal.      No lesions in the vagina.      Genitourinary Comments: Hysterectomy done       Right Labia: No rash, tenderness, lesions, skin changes or Bartholin's cyst.     Left Labia: No tenderness, lesions, skin changes, Bartholin's cyst or rash.     Vaginal cuff intact.     No vaginal discharge, erythema, tenderness, bleeding, ulceration, mesh exposure, granulation tissue or cuff induration.      No vaginal prolapse present.     Mild vaginal atrophy present.       Right Adnexa: not tender, not full, not palpable, no mass present and not absent.     Left Adnexa: not tender, not full, not palpable, no mass present and not absent.  Breasts:     Right: Normal. Present. No swelling, bleeding, inverted nipple, mass, nipple discharge, skin change, tenderness or breast implant.      Left: Normal. Present. No swelling, bleeding, inverted nipple, mass, nipple discharge, skin change, tenderness or breast implant.   HENT:      Head: Normocephalic.      Nose: Nose normal.      Mouth/Throat:      Mouth: Mucous membranes are moist.   Cardiovascular:      Rate and Rhythm: Normal rate and regular rhythm.   Pulmonary:      Effort: Pulmonary effort is " normal.      Breath sounds: Normal breath sounds.   Abdominal:      General: Abdomen is flat.      Palpations: Abdomen is soft.   Musculoskeletal:         General: Normal range of motion.      Cervical back: Normal range of motion.   Lymphadenopathy:      Upper Body:      Right upper body: No supraclavicular or axillary adenopathy.      Left upper body: No supraclavicular or axillary adenopathy.   Neurological:      General: No focal deficit present.      Mental Status: She is alert.   Skin:     General: Skin is warm.   Psychiatric:         Mood and Affect: Mood normal.        Lian Earl, APRN-CNM

## 2025-05-20 DIAGNOSIS — J45.30 MILD PERSISTENT ASTHMA WITHOUT COMPLICATION (HHS-HCC): ICD-10-CM

## 2025-05-20 RX ORDER — FLUTICASONE PROPIONATE AND SALMETEROL 250; 50 UG/1; UG/1
1 POWDER RESPIRATORY (INHALATION) 2 TIMES DAILY
Qty: 180 EACH | Refills: 1 | Status: SHIPPED | OUTPATIENT
Start: 2025-05-20

## 2025-06-16 NOTE — PROGRESS NOTES
Chief Complaint  High risk surveillance, atypical ductal hyperplasia    History Of Present Illness  Zenia Landrum is a very pleasant 59 y.o. female presenting for a high risk breast cancer evaluation. 2025 left breast core biopsy showed atypical ductal hyperplasia. No excision at this time as it was decided with Dr. Rob since the calcifications were only 8mm and well sampled by core biopsy and opted for short term follow up imaging. She denies breast biopsy or surgery. She has family history breast cancer in paternal aunt.      BREAST IMAGIN2025 Bilateral screening mammogram, BI-RADS Category 0. Indeterminate left breast calcifications. 2025 LEFT diagnostic mammogram BI-RADS Category 4. Left breast calcifications warranting stereotactic guided biopsy.       REPRODUCTIVE HISTORY:  menarche age 11, , no OCPs, surgical menopause age 42 s/p hysterectomy for fibroids (ovaries intact), no HRT or fertility meds          FAMILY CANCER HISTORY:   Paternal aunt:  breast cancer age 60s  Maternal cousin: ovarian cancer age 50s    Review of Systems  Constitutional:  Negative for appetite change, fatigue, fever and unexpected weight change.   HENT:  Negative for ear pain, hearing loss, nosebleeds, sore throat and trouble swallowing.    Eyes:  Negative for discharge, itching and visual disturbance.   Breast: As stated in HPI.  Respiratory:  Negative for cough, chest tightness and shortness of breath.    Cardiovascular:  Negative for chest pain, palpitations and leg swelling.   Gastrointestinal:  Negative for abdominal pain, constipation, diarrhea and nausea.   Endocrine: Negative for cold intolerance and heat intolerance.   Genitourinary:  Negative for dysuria, frequency, hematuria, pelvic pain and vaginal bleeding.   Musculoskeletal:  Negative for arthralgias, back pain, gait problem, joint swelling and myalgias.   Skin:  Negative for color change and rash.   Allergic/Immunologic: Negative for  environmental allergies and food allergies.   Neurological:  Negative for dizziness, tremors, speech difficulty, weakness, numbness and headaches.   Hematological:  Does not bruise/bleed easily.   Psychiatric/Behavioral:  Negative for agitation, dysphoric mood and sleep disturbance. The patient is not nervous/anxious.       Surgical History  She has a past surgical history that includes Foot surgery (06/28/2013) and Hysterectomy (06/28/2013).     Social History  She reports that she quit smoking about 2 years ago. Her smoking use included cigarettes. She started smoking about 42 years ago. She has a 20 pack-year smoking history. She has never used smokeless tobacco. She reports that she does not currently use alcohol. She reports current drug use. Drug: Marijuana.    Family History  Family History[1]     Allergies  Amoxicillin and Penicillins    Past Medical History  She has a past medical history of Chronic sinusitis, unspecified, Chronic sinusitis, unspecified, Hypertension, MI (myocardial infarction) (Multi), Other conditions influencing health status, Personal history of other specified conditions, and Personal history of other specified conditions.     Physical Exam  Patient is alert and oriented x3 and in a relaxed and appropriate mood. Her gait is steady and hand grasps are equal. Sclera is clear. The breasts are nearly symmetrical. The tissue is soft without palpable abnormalities, discrete nodules or masses. The skin and nipples appear normal. There is no cervical, supraclavicular or axillary lymphadenopathy. Heart rate and rhythm normal, S1 and S2 appreciated. The lungs are clear to auscultation bilaterally. Abdomen is soft and non-tender.      Last Recorded Vitals  Blood pressure 119/79, pulse 76, temperature 36.6 °C (97.9 °F), temperature source Temporal, weight 106 kg (234 lb), SpO2 95%.    Relevant Results and Imaging  none  Provider Impressions  Normal clinical breast exam, high risk surveillance  care-atypical ductal hyperplasia.    I reviewed the mechanism of action and potential side effects of tamoxifen, including but not limited to hot flashes/vasomotor symptoms, thrombosis (DVT/PE), and uterine malignancy.     I also reviewed the mechanism of action and potential side effects of aromatase inhibitor therapy, including but not limited to arthralgias/myalgias, osteoporosis with potential fracture risk, hot flashes/vasomotor symptoms.  I recommended a baseline bone density evaluation if she were to proceed with an aromatase inhibitor.    She will notify office once she determines which medication she would like to initiate.     Breast MRI due October 2025    Risk Profile      Patient Discussion/Summary  Your clinical examination is normal. Please return in April 2026 for mammogram and office visit or sooner if you have any problems or concerns.     High risk breast surveillance care plan:  Yearly mammogram with digital breast tomosynthesis  Twice yearly clinical breast examinations  Breast MRI-  Monthly self breast examinations &/or regular self breast awareness  Vitamin D3 2000 IU/daily (over the counter) unless your PCP recommends you take a specific dose  Exercise 3-4 times per week for 45-60 minutes  Limit alcohol to 3-4 drinks per week if you are menopausal  Eat healthy low-fat diet with lots of vegetable & fruits    Risk model indicates you are eligible for endocrine therapy with Tamoxifen, Raloxifene or Aromatase inhibitor. Endocrine therapy reduces lifetime risk of breast cancer by up to 50% when taken for 5 years. There is an alternative low dose Tamoxifen which can be taken for only 3 years.      IMPORTANT INFORMATION REGARDING YOUR RESULTS    If you receive medical information from My [facility] Personal Health Record (online chart) your results will be released into your chart. This means you may view or see results of your biopsy or procedure before I contact you directly. If this occurs, please  call the office and we will discuss your results over the phone.     You can see your health information, review clinical summaries from office visits & test results online when you follow your health with MY  Chart, a personal health record. To sign up go to [email or web address]. If you need assistance with signing up or trouble getting into your account call [employer] Patient Line 24/7 at [phone].    My office phone number is [phone] if you need to get in touch with me or have additional questions or concerns. Thank you for choosing [facility] and trusting me as your healthcare provider. I look forward to seeing you again at your next office visit. I am honored to be a provider on your health care team and I remain dedicated to helping you achieve your health goals.    Jordana Rossi, APRN-CNP         [1]   Family History  Problem Relation Name Age of Onset    Other (bladder ca) Father  65    Breast cancer Father's Sister  60 - 69    Prostate cancer Maternal Grandfather  70 - 79    Colon cancer Paternal Grandmother  80 - 89    Ovarian cancer Maternal Cousin  50 - 59

## 2025-06-18 ENCOUNTER — OFFICE VISIT (OUTPATIENT)
Dept: SURGICAL ONCOLOGY | Facility: CLINIC | Age: 60
End: 2025-06-18
Payer: COMMERCIAL

## 2025-06-18 VITALS
BODY MASS INDEX: 37.77 KG/M2 | SYSTOLIC BLOOD PRESSURE: 119 MMHG | HEART RATE: 76 BPM | OXYGEN SATURATION: 95 % | DIASTOLIC BLOOD PRESSURE: 79 MMHG | WEIGHT: 234 LBS | TEMPERATURE: 97.9 F

## 2025-06-18 DIAGNOSIS — Z80.3 FAMILY HISTORY OF BREAST CANCER: ICD-10-CM

## 2025-06-18 DIAGNOSIS — Z12.39 BREAST CANCER SCREENING, HIGH RISK PATIENT: ICD-10-CM

## 2025-06-18 DIAGNOSIS — N60.99 ATYPICAL DUCTAL HYPERPLASIA OF BREAST: ICD-10-CM

## 2025-06-18 DIAGNOSIS — Z12.31 SCREENING MAMMOGRAM FOR BREAST CANCER: Primary | ICD-10-CM

## 2025-06-18 PROCEDURE — 3074F SYST BP LT 130 MM HG: CPT

## 2025-06-18 PROCEDURE — 1036F TOBACCO NON-USER: CPT

## 2025-06-18 PROCEDURE — 99215 OFFICE O/P EST HI 40 MIN: CPT

## 2025-06-18 PROCEDURE — 3078F DIAST BP <80 MM HG: CPT

## 2025-06-18 ASSESSMENT — PAIN SCALES - GENERAL: PAINLEVEL_OUTOF10: 0-NO PAIN

## 2025-06-18 NOTE — PATIENT INSTRUCTIONS
Your clinical examination is normal. Please return in April 2026 for mammogram and office visit or sooner if you have any problems or concerns.     High risk breast surveillance care plan:  Yearly mammogram with digital breast tomosynthesis  Twice yearly clinical breast examinations  Breast MRI-  Monthly self breast examinations &/or regular self breast awareness  Vitamin D3 2000 IU/daily (over the counter) unless your PCP recommends you take a specific dose  Exercise 3-4 times per week for 45-60 minutes  Limit alcohol to 3-4 drinks per week if you are menopausal  Eat healthy low-fat diet with lots of vegetable & fruits    Risk model indicates you are eligible for endocrine therapy with Tamoxifen, Raloxifene or Aromatase inhibitor. Endocrine therapy reduces lifetime risk of breast cancer by up to 50% when taken for 5 years. There is an alternative low dose Tamoxifen which can be taken for only 3 years.      IMPORTANT INFORMATION REGARDING YOUR RESULTS    If you receive medical information from My [facility] Personal Health Record (online chart) your results will be released into your chart. This means you may view or see results of your biopsy or procedure before I contact you directly. If this occurs, please call the office and we will discuss your results over the phone.     You can see your health information, review clinical summaries from office visits & test results online when you follow your health with MY  Chart, a personal health record. To sign up go to [email or web address]. If you need assistance with signing up or trouble getting into your account call [employer] Patient Line 24/7 at [phone].    My office phone number is [phone] if you need to get in touch with me or have additional questions or concerns. Thank you for choosing [facility] and trusting me as your healthcare provider. I look forward to seeing you again at your next office visit. I am honored to be a provider on your  health care team and I remain dedicated to helping you achieve your health goals.

## 2025-06-26 DIAGNOSIS — G90.A POTS (POSTURAL ORTHOSTATIC TACHYCARDIA SYNDROME): ICD-10-CM

## 2025-06-27 RX ORDER — LORAZEPAM 0.5 MG/1
0.5 TABLET ORAL EVERY 6 HOURS PRN
Qty: 5 TABLET | Refills: 0 | Status: SHIPPED | OUTPATIENT
Start: 2025-06-27

## 2025-07-01 ENCOUNTER — PATIENT MESSAGE (OUTPATIENT)
Dept: PRIMARY CARE | Facility: CLINIC | Age: 60
End: 2025-07-01
Payer: COMMERCIAL

## 2025-07-03 ENCOUNTER — APPOINTMENT (OUTPATIENT)
Dept: UROLOGY | Facility: CLINIC | Age: 60
End: 2025-07-03
Payer: COMMERCIAL

## 2025-07-03 VITALS
HEIGHT: 66 IN | WEIGHT: 225 LBS | SYSTOLIC BLOOD PRESSURE: 111 MMHG | BODY MASS INDEX: 36.16 KG/M2 | HEART RATE: 80 BPM | TEMPERATURE: 97.1 F | DIASTOLIC BLOOD PRESSURE: 73 MMHG

## 2025-07-03 DIAGNOSIS — N39.44 NOCTURNAL ENURESIS: ICD-10-CM

## 2025-07-03 DIAGNOSIS — N39.41 URGE INCONTINENCE: Primary | ICD-10-CM

## 2025-07-03 DIAGNOSIS — R39.15 URGENCY OF URINATION: ICD-10-CM

## 2025-07-03 LAB
POC APPEARANCE, URINE: CLEAR
POC BILIRUBIN, URINE: NEGATIVE
POC BLOOD, URINE: NEGATIVE
POC COLOR, URINE: YELLOW
POC GLUCOSE, URINE: NEGATIVE MG/DL
POC KETONES, URINE: NEGATIVE MG/DL
POC LEUKOCYTES, URINE: NEGATIVE
POC NITRITE,URINE: NEGATIVE
POC PH, URINE: 5.5 PH
POC PROTEIN, URINE: NEGATIVE MG/DL
POC SPECIFIC GRAVITY, URINE: 1.02
POC UROBILINOGEN, URINE: 0.2 EU/DL

## 2025-07-03 PROCEDURE — 3078F DIAST BP <80 MM HG: CPT | Performed by: NURSE PRACTITIONER

## 2025-07-03 PROCEDURE — 81003 URINALYSIS AUTO W/O SCOPE: CPT | Performed by: NURSE PRACTITIONER

## 2025-07-03 PROCEDURE — 3008F BODY MASS INDEX DOCD: CPT | Performed by: NURSE PRACTITIONER

## 2025-07-03 PROCEDURE — 3074F SYST BP LT 130 MM HG: CPT | Performed by: NURSE PRACTITIONER

## 2025-07-03 PROCEDURE — 1036F TOBACCO NON-USER: CPT | Performed by: NURSE PRACTITIONER

## 2025-07-03 PROCEDURE — 99204 OFFICE O/P NEW MOD 45 MIN: CPT | Performed by: NURSE PRACTITIONER

## 2025-07-03 RX ORDER — MIRABEGRON 25 MG/1
25 TABLET, FILM COATED, EXTENDED RELEASE ORAL DAILY
Qty: 30 TABLET | Refills: 11 | Status: SHIPPED | OUTPATIENT
Start: 2025-07-03 | End: 2026-07-03

## 2025-07-03 ASSESSMENT — PATIENT HEALTH QUESTIONNAIRE - PHQ9
1. LITTLE INTEREST OR PLEASURE IN DOING THINGS: SEVERAL DAYS
2. FEELING DOWN, DEPRESSED OR HOPELESS: SEVERAL DAYS
SUM OF ALL RESPONSES TO PHQ9 QUESTIONS 1 AND 2: 2

## 2025-07-03 NOTE — PATIENT INSTRUCTIONS
Plan:   Myrbetriq 25 mg daily w supper  Monitor bp daily x 1 week, then weekly x 1 month  Plan to increase in next few months if tolerated well  Bladder retraining guide given  Voiding diary  Monitor bladder irritants  Follow up 6 weeks pelvic exam, discuss PFPT and procedures again if needed  Nurse line [phone]      Discussed OAB at length including diagnosis, natural history, pathophysiology and management options.    - First line therapy is behavioral modification, limiting bladder irritants, kegel exercises.    - Second line therapies are daily oral medications, either anticholinergics or beta 3 agonists.  Reviewed side effect profiles daniel dry mouth, dry eyes and constipation for anticholinergics and hypertension with mirabegron.  Discussed longterm risk of cognitive issues with anticholinergics.    - Third line therapies are procedural including pretibial nerve stimulation (PTNS; 12 weekly visits for in-office treatments lasting 30-40 minutes then monthly maintenance visits indefinitely), cystoscopy with injection of Botox (treatments every 6 months, risk of retention), sacral neuromodulation (two-step implant procedure, device typically lasting 15-20 years).

## 2025-07-03 NOTE — PROGRESS NOTES
"07/03/25   [id number]    Chief Complaint   Patient presents with    Urinary Incontinence      Subjective      HPI Zenia Landrum is a 59 y.o. female who presents for urge urinary incontinence.     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, heart attack (2023), HTN, cholesterol, panic attacks;   PSH hysterectomy  FH bladder cancer (father) he was also quadriplegic w catheter;   SH retired from [employer] [facility] [employer], quit smoking in 2023    UA neg today, PVR 0 cc  Creatinine 0.61,  on 4/17/25      Objective     /73   Pulse 80   Temp 36.2 °C (97.1 °F)   Ht 1.676 m (5' 6\")   Wt 102 kg (225 lb)   BMI 36.32 kg/m²    Physical Exam  General: Appears comfortable and in no apparent distress, well nourished  Head: Normocephalic, atraumatic  Neck: trachea midline  Respiratory: respirations unlabored, no wheezes, and no use of accessory muscles  Cardiovascular: at rest no dyspnea, well perfused  Skin: no visible rashes or lesions  Neurologic: grossly intact, oriented to person, place, and time  Psychiatric: mood and affect appropriate  Musculoskeletal: in chair for appt. no difficulty w upper body movement    Assessment/Plan   Problem List Items Addressed This Visit          Genitourinary and Reproductive    Urge incontinence - Primary    Relevant Medications    mirabegron (Myrbetriq) 25 mg tablet extended release 24 hr    Other Relevant Orders    POCT UA Automated manually resulted " (Completed)    Post-Void Residual (Completed)     Other Visit Diagnoses         Urgency of urination          Nocturnal enuresis              Orders Placed This Encounter   Procedures    Post-Void Residual    POCT UA Automated manually resulted     Release result to MyChart:   Immediate [1]      Plan:   Myrbetriq 25 mg daily w supper  Monitor bp daily x 1 week, then weekly x 1 month  Plan to increase in next few months if tolerated well  Bladder retraining guide given  Voiding diary  Monitor bladder irritants  Follow up 6 weeks pelvic exam, discuss PFPT and procedures again if needed  Nurse line [phone]      Discussed OAB at length including diagnosis, natural history, pathophysiology and management options.    - First line therapy is behavioral modification, limiting bladder irritants, kegel exercises.    - Second line therapies are daily oral medications, either anticholinergics or beta 3 agonists.  Reviewed side effect profiles daniel dry mouth, dry eyes and constipation for anticholinergics and hypertension with mirabegron.  Discussed longterm risk of cognitive issues with anticholinergics.    - Third line therapies are procedural including pretibial nerve stimulation (PTNS; 12 weekly visits for in-office treatments lasting 30-40 minutes then monthly maintenance visits indefinitely), cystoscopy with injection of Botox (treatments every 6 months, risk of retention), sacral neuromodulation (two-step implant procedure, device typically lasting 15-20 years).         MARLENE Zamora-CNP  Lab Results   Component Value Date    GLUCOSE 95 04/17/2025    CALCIUM 9.4 04/17/2025     04/17/2025    K 4.3 04/17/2025    CO2 22 04/17/2025     04/17/2025    BUN 11 04/17/2025    CREATININE 0.61 04/17/2025

## 2025-07-08 DIAGNOSIS — N60.99 ATYPICAL DUCTAL HYPERPLASIA OF BREAST: Primary | ICD-10-CM

## 2025-07-08 DIAGNOSIS — Z78.0 MENOPAUSE: ICD-10-CM

## 2025-07-08 DIAGNOSIS — Z12.39 BREAST CANCER SCREENING, HIGH RISK PATIENT: ICD-10-CM

## 2025-07-08 RX ORDER — ANASTROZOLE 1 MG/1
1 TABLET ORAL DAILY
Qty: 90 TABLET | Refills: 3 | Status: SHIPPED | OUTPATIENT
Start: 2025-07-08 | End: 2026-07-03

## 2025-07-11 ENCOUNTER — OFFICE VISIT (OUTPATIENT)
Dept: PRIMARY CARE | Facility: CLINIC | Age: 60
End: 2025-07-11
Payer: COMMERCIAL

## 2025-07-11 VITALS
WEIGHT: 226 LBS | DIASTOLIC BLOOD PRESSURE: 68 MMHG | OXYGEN SATURATION: 98 % | BODY MASS INDEX: 36.32 KG/M2 | HEIGHT: 66 IN | HEART RATE: 77 BPM | SYSTOLIC BLOOD PRESSURE: 142 MMHG

## 2025-07-11 DIAGNOSIS — G90.A POTS (POSTURAL ORTHOSTATIC TACHYCARDIA SYNDROME): ICD-10-CM

## 2025-07-11 DIAGNOSIS — F41.9 ANXIETY: Primary | ICD-10-CM

## 2025-07-11 PROCEDURE — 99214 OFFICE O/P EST MOD 30 MIN: CPT

## 2025-07-11 PROCEDURE — 3077F SYST BP >= 140 MM HG: CPT

## 2025-07-11 PROCEDURE — 1036F TOBACCO NON-USER: CPT

## 2025-07-11 PROCEDURE — 3078F DIAST BP <80 MM HG: CPT

## 2025-07-11 PROCEDURE — 3008F BODY MASS INDEX DOCD: CPT

## 2025-07-11 RX ORDER — HYDROXYZINE HYDROCHLORIDE 10 MG/1
10 TABLET, FILM COATED ORAL EVERY 8 HOURS PRN
Qty: 90 TABLET | Refills: 0 | Status: SHIPPED | OUTPATIENT
Start: 2025-07-11 | End: 2025-08-10

## 2025-07-11 RX ORDER — CITALOPRAM 10 MG/1
10 TABLET ORAL DAILY
Qty: 30 TABLET | Refills: 1 | Status: SHIPPED | OUTPATIENT
Start: 2025-07-11 | End: 2025-09-09

## 2025-07-11 RX ORDER — LORAZEPAM 0.5 MG/1
0.5 TABLET ORAL EVERY 6 HOURS PRN
Qty: 30 TABLET | Refills: 0 | Status: SHIPPED | OUTPATIENT
Start: 2025-07-11

## 2025-07-11 ASSESSMENT — PATIENT HEALTH QUESTIONNAIRE - PHQ9
1. LITTLE INTEREST OR PLEASURE IN DOING THINGS: NOT AT ALL
2. FEELING DOWN, DEPRESSED OR HOPELESS: NOT AT ALL
SUM OF ALL RESPONSES TO PHQ9 QUESTIONS 1 AND 2: 0

## 2025-07-11 NOTE — PROGRESS NOTES
"Zenia Landrum is a 59 y.o. year old female presenting for Med Refill       HPI:    POTS  Her neurologist started her on this medication 10+ years ago for POTS.  She is due for refill of her Ativan 0.5 mg as needed when she is having bad hot symptoms. It has been one of the only things that has been helpful for her POTS symptoms and she really takes it sparingly when her symptoms are severe. Reports no side effects to the medication up to this point. Her last refill for 30 tablets lasted her several months.  Last refill was in March 2025, she did receive a 5 tablet prescription in the end of June while she waited for an appointment.  States she has been needing to use this medication more due to anxiety,  Her  lost his job and they are going through house changes.   In addition, she was recently told she is high risk for cancer.     Anxiety  She took herself off of Lexapro 20 mg daily for anxiety control, States that she felt like this medication was increasing her depression.    Weight Loss  Is currently on GLP1 from another provider.       ROS:   All pertinent positive symptoms are included in history of present illness.    All other systems have been reviewed and are negative and noncontributory to this patient's current ailments.    Current Medications[1]    OBJECTIVE  Visit Vitals  /68   Pulse 77   Ht 1.676 m (5' 6\")   Wt 103 kg (226 lb)   SpO2 98%   BMI 36.48 kg/m²   OB Status Hysterectomy   Smoking Status Former   BSA 2.19 m²        Physical Exam:  CONSTITUTIONAL - well nourished, well developed, looks like stated age, in no acute distress, not ill-appearing, and not tired appearing  SKIN - normal skin color and pigmentation, normal skin turgor without rash, lesions, or nodules visualized  HEAD - no trauma, normocephalic  EYES - normal external exam  LUNG - clear to auscultation, no wheezing, no crackles and no rales, good effort  CARDIAC - regular rate and regular rhythm, no skipped beats, no " murmur  ABDOMEN - no organomegaly, soft, nontender, nondistended, normal bowel sounds  EXTREMITIES - no edema, no deformities  NEUROLOGICAL - normal balance, normal motor, no ataxia  PSYCHIATRIC - alert, pleasant and cordial, age-appropriate      Assessment/Plan   Diagnoses and all orders for this visit:  Anxiety  Discussed anxiety at length today as well as the risk of using lorazepam for anxiety and not just her POTS.  Advised patient to limit her lorazepam use.  After much discussion we have decided that we will start a prescription for Celexa 10 mg once daily to help lower her baseline anxiety.  We will also send her a prescription for Atarax 10 mg once daily to help with her panic attacks.  A referral has been provided for [facility] psychiatry as well as [facility] to help further with her anxiety.  We will follow-up in 4 to 6 weeks to reassess the efficacy and tolerance of her medication as well as the potential side effects.    POTS (postural orthostatic tachycardia syndrome)  A refill for lorazepam has been placed.  Discussed with patient at length to use this medication sparingly.  She will not be able to get refills sooner than 3 months for this medication.  CSA and UDS needs completion, we will do this at her next appointment OARRS appropriate, uses sparingly.     We will follow-up in 4 to 6 weeks.                [1]   Current Outpatient Medications   Medication Sig Dispense Refill    albuterol 90 mcg/actuation inhaler INHALE 2 PUFFS 3 TIMES A DAY 18 g 2    anastrozole (Arimidex) 1 mg tablet Take 1 tablet (1 mg total) by mouth once daily.  Swallow whole with a drink of water. 90 tablet 3    aspirin 81 mg chewable tablet Chew and swallow 1 tablet (81 mg) once daily.      atorvastatin (Lipitor) 20 mg tablet Take 1 tablet (20 mg) by mouth once daily at bedtime. 90 tablet 1    carvedilol (Coreg) 6.25 mg tablet Take 1 tablet (6.25 mg) by mouth 2 times a day.      clobetasol (Temovate) 0.05 % ointment Apply  0.05 Applications topically 2 times a day.      LORazepam (Ativan) 0.5 mg tablet Take 1 tablet (0.5 mg) by mouth every 6 hours if needed (POTS). 5 tablet 0    mirabegron (Myrbetriq) 25 mg tablet extended release 24 hr Take 1 tablet (25 mg) by mouth once daily. 30 tablet 11    omeprazole (PriLOSEC) 20 mg DR capsule Take 1 capsule (20 mg) by mouth once daily.      semaglutide 0.25 mg or 0.5 mg (2 mg/3 mL) pen injector Inject 0.5 mg under the skin every 7 days.      citalopram (CeleXA) 10 mg tablet Take 1 tablet (10 mg) by mouth once daily. 30 tablet 1    hydrOXYzine HCL (Atarax) 10 mg tablet Take 1 tablet (10 mg) by mouth every 8 hours if needed for itching. 90 tablet 0     No current facility-administered medications for this visit.

## 2025-07-15 ENCOUNTER — APPOINTMENT (OUTPATIENT)
Dept: RADIOLOGY | Facility: CLINIC | Age: 60
End: 2025-07-15
Payer: COMMERCIAL

## 2025-07-18 ENCOUNTER — HOSPITAL ENCOUNTER (OUTPATIENT)
Dept: RADIOLOGY | Facility: CLINIC | Age: 60
Discharge: HOME | End: 2025-07-18
Payer: COMMERCIAL

## 2025-07-18 DIAGNOSIS — Z78.0 MENOPAUSE: ICD-10-CM

## 2025-07-18 DIAGNOSIS — N60.99 ATYPICAL DUCTAL HYPERPLASIA OF BREAST: ICD-10-CM

## 2025-07-18 DIAGNOSIS — Z12.39 BREAST CANCER SCREENING, HIGH RISK PATIENT: ICD-10-CM

## 2025-07-23 ENCOUNTER — APPOINTMENT (OUTPATIENT)
Dept: RADIOLOGY | Facility: CLINIC | Age: 60
End: 2025-07-23
Payer: COMMERCIAL

## 2025-07-23 PROCEDURE — 77080 DXA BONE DENSITY AXIAL: CPT

## 2025-07-23 PROCEDURE — 77080 DXA BONE DENSITY AXIAL: CPT | Performed by: RADIOLOGY

## 2025-07-29 ENCOUNTER — APPOINTMENT (OUTPATIENT)
Dept: PRIMARY CARE | Facility: CLINIC | Age: 60
End: 2025-07-29
Payer: COMMERCIAL

## 2025-08-14 ENCOUNTER — APPOINTMENT (OUTPATIENT)
Dept: RADIOLOGY | Facility: HOSPITAL | Age: 60
End: 2025-08-14
Payer: COMMERCIAL

## 2025-08-14 ENCOUNTER — OFFICE VISIT (OUTPATIENT)
Dept: URGENT CARE | Age: 60
End: 2025-08-14
Payer: COMMERCIAL

## 2025-08-14 ENCOUNTER — HOSPITAL ENCOUNTER (EMERGENCY)
Facility: HOSPITAL | Age: 60
Discharge: HOME | End: 2025-08-14
Payer: COMMERCIAL

## 2025-08-14 ENCOUNTER — APPOINTMENT (OUTPATIENT)
Dept: CARDIOLOGY | Facility: HOSPITAL | Age: 60
End: 2025-08-14
Payer: COMMERCIAL

## 2025-08-14 VITALS
RESPIRATION RATE: 18 BRPM | OXYGEN SATURATION: 99 % | HEIGHT: 66 IN | WEIGHT: 218 LBS | DIASTOLIC BLOOD PRESSURE: 79 MMHG | BODY MASS INDEX: 35.03 KG/M2 | TEMPERATURE: 98.4 F | SYSTOLIC BLOOD PRESSURE: 134 MMHG | HEART RATE: 64 BPM

## 2025-08-14 VITALS
BODY MASS INDEX: 36.48 KG/M2 | WEIGHT: 226 LBS | DIASTOLIC BLOOD PRESSURE: 75 MMHG | TEMPERATURE: 97.7 F | HEART RATE: 69 BPM | SYSTOLIC BLOOD PRESSURE: 154 MMHG | OXYGEN SATURATION: 98 %

## 2025-08-14 DIAGNOSIS — R10.13 EPIGASTRIC DISCOMFORT: ICD-10-CM

## 2025-08-14 DIAGNOSIS — R07.9 CHEST PAIN, UNSPECIFIED TYPE: Primary | ICD-10-CM

## 2025-08-14 DIAGNOSIS — R00.2 PALPITATIONS: ICD-10-CM

## 2025-08-14 DIAGNOSIS — R14.2 BELCHING: ICD-10-CM

## 2025-08-14 LAB
ALBUMIN SERPL BCP-MCNC: 4.7 G/DL (ref 3.4–5)
ALP SERPL-CCNC: 71 U/L (ref 33–136)
ALT SERPL W P-5'-P-CCNC: 22 U/L (ref 7–45)
ANION GAP SERPL CALC-SCNC: 15 MMOL/L (ref 10–20)
AST SERPL W P-5'-P-CCNC: 30 U/L (ref 9–39)
BASOPHILS # BLD AUTO: 0.07 X10*3/UL (ref 0–0.1)
BASOPHILS NFR BLD AUTO: 0.6 %
BILIRUB SERPL-MCNC: 0.7 MG/DL (ref 0–1.2)
BUN SERPL-MCNC: 20 MG/DL (ref 6–23)
CALCIUM SERPL-MCNC: 10.1 MG/DL (ref 8.6–10.3)
CARDIAC TROPONIN I PNL SERPL HS: 3 NG/L (ref 0–13)
CARDIAC TROPONIN I PNL SERPL HS: 3 NG/L (ref 0–13)
CHLORIDE SERPL-SCNC: 106 MMOL/L (ref 98–107)
CO2 SERPL-SCNC: 23 MMOL/L (ref 21–32)
CREAT SERPL-MCNC: 0.59 MG/DL (ref 0.5–1.05)
EGFRCR SERPLBLD CKD-EPI 2021: >90 ML/MIN/1.73M*2
EOSINOPHIL # BLD AUTO: 0.38 X10*3/UL (ref 0–0.7)
EOSINOPHIL NFR BLD AUTO: 3.4 %
ERYTHROCYTE [DISTWIDTH] IN BLOOD BY AUTOMATED COUNT: 13.2 % (ref 11.5–14.5)
GLUCOSE SERPL-MCNC: 96 MG/DL (ref 74–99)
HCT VFR BLD AUTO: 43.9 % (ref 36–46)
HGB BLD-MCNC: 14.8 G/DL (ref 12–16)
IMM GRANULOCYTES # BLD AUTO: 0.03 X10*3/UL (ref 0–0.7)
IMM GRANULOCYTES NFR BLD AUTO: 0.3 % (ref 0–0.9)
LIPASE SERPL-CCNC: 42 U/L (ref 9–82)
LYMPHOCYTES # BLD AUTO: 3.25 X10*3/UL (ref 1.2–4.8)
LYMPHOCYTES NFR BLD AUTO: 29.5 %
MCH RBC QN AUTO: 29.2 PG (ref 26–34)
MCHC RBC AUTO-ENTMCNC: 33.7 G/DL (ref 32–36)
MCV RBC AUTO: 87 FL (ref 80–100)
MONOCYTES # BLD AUTO: 0.69 X10*3/UL (ref 0.1–1)
MONOCYTES NFR BLD AUTO: 6.3 %
NEUTROPHILS # BLD AUTO: 6.6 X10*3/UL (ref 1.2–7.7)
NEUTROPHILS NFR BLD AUTO: 59.9 %
NRBC BLD-RTO: 0 /100 WBCS (ref 0–0)
PLATELET # BLD AUTO: 277 X10*3/UL (ref 150–450)
POTASSIUM SERPL-SCNC: 4.8 MMOL/L (ref 3.5–5.3)
PROT SERPL-MCNC: 7.7 G/DL (ref 6.4–8.2)
RBC # BLD AUTO: 5.06 X10*6/UL (ref 4–5.2)
SODIUM SERPL-SCNC: 139 MMOL/L (ref 136–145)
WBC # BLD AUTO: 11 X10*3/UL (ref 4.4–11.3)

## 2025-08-14 PROCEDURE — 80053 COMPREHEN METABOLIC PANEL: CPT | Performed by: NURSE PRACTITIONER

## 2025-08-14 PROCEDURE — 71045 X-RAY EXAM CHEST 1 VIEW: CPT

## 2025-08-14 PROCEDURE — 84484 ASSAY OF TROPONIN QUANT: CPT | Performed by: NURSE PRACTITIONER

## 2025-08-14 PROCEDURE — 76705 ECHO EXAM OF ABDOMEN: CPT

## 2025-08-14 PROCEDURE — 36415 COLL VENOUS BLD VENIPUNCTURE: CPT | Performed by: NURSE PRACTITIONER

## 2025-08-14 PROCEDURE — 99285 EMERGENCY DEPT VISIT HI MDM: CPT | Mod: 25

## 2025-08-14 PROCEDURE — 76705 ECHO EXAM OF ABDOMEN: CPT | Performed by: RADIOLOGY

## 2025-08-14 PROCEDURE — 93005 ELECTROCARDIOGRAM TRACING: CPT

## 2025-08-14 PROCEDURE — 71045 X-RAY EXAM CHEST 1 VIEW: CPT | Performed by: STUDENT IN AN ORGANIZED HEALTH CARE EDUCATION/TRAINING PROGRAM

## 2025-08-14 PROCEDURE — 83690 ASSAY OF LIPASE: CPT | Performed by: NURSE PRACTITIONER

## 2025-08-14 PROCEDURE — 85025 COMPLETE CBC W/AUTO DIFF WBC: CPT | Performed by: NURSE PRACTITIONER

## 2025-08-14 ASSESSMENT — PAIN - FUNCTIONAL ASSESSMENT: PAIN_FUNCTIONAL_ASSESSMENT: 0-10

## 2025-08-14 ASSESSMENT — HEART SCORE
TROPONIN: LESS THAN OR EQUAL TO NORMAL LIMIT
ECG: NORMAL
AGE: 45-64
HEART SCORE: 2
RISK FACTORS: 1-2 RISK FACTORS
HISTORY: SLIGHTLY SUSPICIOUS

## 2025-08-14 ASSESSMENT — PAIN DESCRIPTION - PAIN TYPE: TYPE: ACUTE PAIN

## 2025-08-14 ASSESSMENT — PAIN DESCRIPTION - DESCRIPTORS: DESCRIPTORS: ACHING

## 2025-08-14 ASSESSMENT — PAIN SCALES - GENERAL
PAINLEVEL_OUTOF10: 0 - NO PAIN
PAINLEVEL_OUTOF10: 2

## 2025-08-14 ASSESSMENT — PAIN DESCRIPTION - LOCATION: LOCATION: CHEST

## 2025-08-14 ASSESSMENT — PAIN DESCRIPTION - ORIENTATION: ORIENTATION: LEFT

## 2025-08-15 ENCOUNTER — APPOINTMENT (OUTPATIENT)
Dept: UROLOGY | Facility: CLINIC | Age: 60
End: 2025-08-15
Payer: COMMERCIAL

## 2025-08-16 LAB
ATRIAL RATE: 68 BPM
P AXIS: 68 DEGREES
P OFFSET: 163 MS
P ONSET: 126 MS
PR INTERVAL: 192 MS
Q ONSET: 222 MS
QRS COUNT: 11 BEATS
QRS DURATION: 74 MS
QT INTERVAL: 376 MS
QTC CALCULATION(BAZETT): 399 MS
QTC FREDERICIA: 392 MS
R AXIS: 89 DEGREES
T AXIS: 71 DEGREES
T OFFSET: 410 MS
VENTRICULAR RATE: 68 BPM

## 2025-09-05 ENCOUNTER — PATIENT MESSAGE (OUTPATIENT)
Dept: PRIMARY CARE | Facility: CLINIC | Age: 60
End: 2025-09-05
Payer: COMMERCIAL

## 2025-09-05 DIAGNOSIS — F41.9 ANXIETY: ICD-10-CM

## 2025-09-05 DIAGNOSIS — G90.A POTS (POSTURAL ORTHOSTATIC TACHYCARDIA SYNDROME): ICD-10-CM

## 2025-09-05 DIAGNOSIS — I10 PRIMARY HYPERTENSION: Primary | ICD-10-CM

## 2025-09-05 RX ORDER — CARVEDILOL 6.25 MG/1
6.25 TABLET ORAL 2 TIMES DAILY
Qty: 180 TABLET | Refills: 0 | Status: SHIPPED | OUTPATIENT
Start: 2025-09-05 | End: 2025-12-04

## 2025-09-05 RX ORDER — CITALOPRAM 10 MG/1
10 TABLET ORAL DAILY
Qty: 90 TABLET | Refills: 0 | Status: SHIPPED | OUTPATIENT
Start: 2025-09-05 | End: 2025-12-04

## 2025-11-05 ENCOUNTER — APPOINTMENT (OUTPATIENT)
Facility: CLINIC | Age: 60
End: 2025-11-05
Payer: COMMERCIAL

## 2026-01-02 ENCOUNTER — APPOINTMENT (OUTPATIENT)
Dept: UROLOGY | Facility: CLINIC | Age: 61
End: 2026-01-02
Payer: COMMERCIAL